# Patient Record
Sex: FEMALE | Race: WHITE | NOT HISPANIC OR LATINO
[De-identification: names, ages, dates, MRNs, and addresses within clinical notes are randomized per-mention and may not be internally consistent; named-entity substitution may affect disease eponyms.]

---

## 2018-05-23 ENCOUNTER — OTHER (OUTPATIENT)
Age: 68
End: 2018-05-23

## 2018-05-31 ENCOUNTER — APPOINTMENT (OUTPATIENT)
Dept: ORTHOPEDIC SURGERY | Facility: CLINIC | Age: 68
End: 2018-05-31

## 2018-09-15 ENCOUNTER — APPOINTMENT (OUTPATIENT)
Dept: DERMATOLOGY | Facility: CLINIC | Age: 68
End: 2018-09-15
Payer: COMMERCIAL

## 2018-09-15 ENCOUNTER — RESULT REVIEW (OUTPATIENT)
Age: 68
End: 2018-09-15

## 2018-09-15 PROCEDURE — 11100 BX SKIN SUBCUTANEOUS&/MUCOUS MEMBRANE 1 LESION: CPT | Mod: 59

## 2018-09-15 PROCEDURE — 99214 OFFICE O/P EST MOD 30 MIN: CPT | Mod: 25

## 2018-09-15 PROCEDURE — 17000 DESTRUCT PREMALG LESION: CPT

## 2018-09-15 PROCEDURE — 17003 DESTRUCT PREMALG LES 2-14: CPT

## 2018-10-15 ENCOUNTER — APPOINTMENT (OUTPATIENT)
Dept: DERMATOLOGY | Facility: CLINIC | Age: 68
End: 2018-10-15
Payer: COMMERCIAL

## 2018-10-15 PROCEDURE — 17261 DSTRJ MAL LES T/A/L .6-1.0CM: CPT

## 2018-12-03 ENCOUNTER — APPOINTMENT (OUTPATIENT)
Dept: ORTHOPEDIC SURGERY | Facility: CLINIC | Age: 68
End: 2018-12-03
Payer: COMMERCIAL

## 2018-12-04 ENCOUNTER — APPOINTMENT (OUTPATIENT)
Dept: ORTHOPEDIC SURGERY | Facility: CLINIC | Age: 68
End: 2018-12-04
Payer: COMMERCIAL

## 2018-12-04 DIAGNOSIS — S82.141A DISPLACED BICONDYLAR FRACTURE OF RIGHT TIBIA, INITIAL ENCOUNTER FOR CLOSED FRACTURE: ICD-10-CM

## 2018-12-04 DIAGNOSIS — S82.251A DISPLACED COMMINUTED FRACTURE OF SHAFT OF RIGHT TIBIA, INITIAL ENCOUNTER FOR CLOSED FRACTURE: ICD-10-CM

## 2018-12-04 PROCEDURE — 99214 OFFICE O/P EST MOD 30 MIN: CPT | Mod: 57

## 2018-12-04 PROCEDURE — 73590 X-RAY EXAM OF LOWER LEG: CPT | Mod: RT

## 2018-12-04 PROCEDURE — 27750 TREATMENT OF TIBIA FRACTURE: CPT | Mod: RT

## 2018-12-18 ENCOUNTER — APPOINTMENT (OUTPATIENT)
Dept: ORTHOPEDIC SURGERY | Facility: CLINIC | Age: 68
End: 2018-12-18
Payer: COMMERCIAL

## 2018-12-18 VITALS
SYSTOLIC BLOOD PRESSURE: 117 MMHG | HEART RATE: 80 BPM | WEIGHT: 139 LBS | DIASTOLIC BLOOD PRESSURE: 70 MMHG | HEIGHT: 67 IN | BODY MASS INDEX: 21.82 KG/M2

## 2018-12-18 DIAGNOSIS — G56.03 CARPAL TUNNEL SYNDROM,BILATERAL UPPER LIMBS: ICD-10-CM

## 2018-12-18 DIAGNOSIS — Z86.69 PERSONAL HISTORY OF OTHER DISEASES OF THE NERVOUS SYSTEM AND SENSE ORGANS: ICD-10-CM

## 2018-12-18 DIAGNOSIS — Z87.39 PERSONAL HISTORY OF OTHER DISEASES OF THE MUSCULOSKELETAL SYSTEM AND CONNECTIVE TISSUE: ICD-10-CM

## 2018-12-18 DIAGNOSIS — M41.50 OTHER SECONDARY SCOLIOSIS, SITE UNSPECIFIED: ICD-10-CM

## 2018-12-18 DIAGNOSIS — S34.129A: ICD-10-CM

## 2018-12-18 DIAGNOSIS — M47.812 SPONDYLOSIS W/OUT MYELOPATHY OR RADICULOPATHY, CERVICAL REGION: ICD-10-CM

## 2018-12-18 DIAGNOSIS — M47.816 SPONDYLOSIS W/OUT MYELOPATHY OR RADICULOPATHY, LUMBAR REGION: ICD-10-CM

## 2018-12-18 DIAGNOSIS — M43.8X9 OTHER SPECIFIED DEFORMING DORSOPATHIES, SITE UNSPECIFIED: ICD-10-CM

## 2018-12-18 PROCEDURE — 72100 X-RAY EXAM L-S SPINE 2/3 VWS: CPT

## 2018-12-18 PROCEDURE — 99215 OFFICE O/P EST HI 40 MIN: CPT | Mod: 24

## 2019-01-15 ENCOUNTER — APPOINTMENT (OUTPATIENT)
Dept: ORTHOPEDIC SURGERY | Facility: CLINIC | Age: 69
End: 2019-01-15

## 2019-02-11 ENCOUNTER — APPOINTMENT (OUTPATIENT)
Dept: DERMATOLOGY | Facility: CLINIC | Age: 69
End: 2019-02-11

## 2019-02-25 ENCOUNTER — OTHER (OUTPATIENT)
Age: 69
End: 2019-02-25

## 2019-02-26 ENCOUNTER — APPOINTMENT (OUTPATIENT)
Dept: ORTHOPEDIC SURGERY | Facility: CLINIC | Age: 69
End: 2019-02-26
Payer: COMMERCIAL

## 2019-02-26 PROCEDURE — 73590 X-RAY EXAM OF LOWER LEG: CPT | Mod: RT

## 2019-02-26 PROCEDURE — 99024 POSTOP FOLLOW-UP VISIT: CPT

## 2019-02-26 NOTE — REASON FOR VISIT
[Follow-Up Visit] : a follow-up visit for [Tibia Fracture] : tibia fracture [FreeTextEntry2] : Right tibia fracture follow up

## 2019-02-26 NOTE — HISTORY OF PRESENT ILLNESS
[de-identified] : 68 year old female  presents to the office today for routine follow up on non operative  right tibia fracture.  she is overall doing well.  She has minimal pain and sensation to RLE due to hx of spine surgery with residual neuro deficit.  She is minimally ambulatory and mainly wheelchair bound.  she was recently started on tymlos 80mg daily for osteoporosis.  She was seen by dr. Martínez who referred her to pain management.

## 2019-02-26 NOTE — DISCUSSION/SUMMARY
[de-identified] : 68-year-old female with refracture of right tibial shaft with good bone healing at fracture site- overall doing well.  She can continue activities as tolerated.  \par \par Orthopaedic Trauma Surgeon Addendum:\par \par I agree with the above physician assistant/nurse practitioner note.  Appropriate imaging has been reviewed and the plan adjusted as needed.\par \par We had a long discussion about the opportunity to adjust her leg alignment. We discussed that a multiplanar Ilizarov external fixator would be required with multiple surgical procedures to restore the alignment of the leg and likely fuse the knee. At the time being she wants to proceed with nonoperative management. She will followup as needed if she wants to discuss further surgical intervention.\par \par The patient was given the opportunity to ask questions and all questions were answered to their satisfaction.\par \par Jonas Stevenson MD\par Orthopaedic Trauma Surgeon\par [facility]\par [facility] [place]\par \par \par \par

## 2019-02-26 NOTE — PHYSICAL EXAM
[de-identified] : General: Well appearing, no acute distress, A&O\par Neurologic: A&Ox3, No focal deficits\par Head: NCAT without abrasions, lacerations, or ecchymosis to head, face, or scalp\par Eyes: No scleral icterus, no gross abnormalities\par Respiratory: Equal chest wall expansion bilaterally, no accessory muscle use\par Lymphatic: No lymphadenopathy palpated\par Skin: Warm and dry\par Psychiatric: Normal mood and affect\par \par Right lower extremity: No TTP over fracture sites. Range of motion of the knee from 45-95°. Decrease sensation to RLE. [de-identified] : plain films of the right tibia were obtained today. They show sequelae of multiple previous fractures with cont'd exuberant callus formation at the fracture site. Right distal shaft fracture with 25 degrees of flexion.\par

## 2019-06-22 ENCOUNTER — APPOINTMENT (OUTPATIENT)
Dept: DERMATOLOGY | Facility: CLINIC | Age: 69
End: 2019-06-22
Payer: COMMERCIAL

## 2019-06-22 PROCEDURE — 17000 DESTRUCT PREMALG LESION: CPT

## 2019-06-22 PROCEDURE — 99214 OFFICE O/P EST MOD 30 MIN: CPT | Mod: 25

## 2019-12-27 ENCOUNTER — RESULT REVIEW (OUTPATIENT)
Age: 69
End: 2019-12-27

## 2019-12-28 ENCOUNTER — APPOINTMENT (OUTPATIENT)
Dept: DERMATOLOGY | Facility: CLINIC | Age: 69
End: 2019-12-28
Payer: COMMERCIAL

## 2019-12-28 PROCEDURE — 17000 DESTRUCT PREMALG LESION: CPT

## 2019-12-28 PROCEDURE — 99214 OFFICE O/P EST MOD 30 MIN: CPT | Mod: 25

## 2020-03-12 ENCOUNTER — TRANSCRIPTION ENCOUNTER (OUTPATIENT)
Age: 70
End: 2020-03-12

## 2020-07-06 ENCOUNTER — APPOINTMENT (OUTPATIENT)
Dept: ORTHOPEDIC SURGERY | Facility: CLINIC | Age: 70
End: 2020-07-06
Payer: COMMERCIAL

## 2020-07-06 VITALS — SYSTOLIC BLOOD PRESSURE: 124 MMHG | HEART RATE: 84 BPM | DIASTOLIC BLOOD PRESSURE: 74 MMHG

## 2020-07-06 VITALS — TEMPERATURE: 97.6 F

## 2020-07-06 DIAGNOSIS — S82.251D DISPLACED COMMINUTED FRACTURE OF SHAFT OF RIGHT TIBIA, SUBSEQUENT ENCOUNTER FOR CLOSED FRACTURE WITH ROUTINE HEALING: ICD-10-CM

## 2020-07-06 DIAGNOSIS — G89.29 PAIN IN RIGHT KNEE: ICD-10-CM

## 2020-07-06 DIAGNOSIS — M25.562 PAIN IN RIGHT KNEE: ICD-10-CM

## 2020-07-06 DIAGNOSIS — M25.561 PAIN IN RIGHT KNEE: ICD-10-CM

## 2020-07-06 PROCEDURE — 99214 OFFICE O/P EST MOD 30 MIN: CPT

## 2020-07-06 PROCEDURE — 73560 X-RAY EXAM OF KNEE 1 OR 2: CPT | Mod: 50

## 2020-07-06 PROCEDURE — 73590 X-RAY EXAM OF LOWER LEG: CPT | Mod: RT

## 2020-07-06 NOTE — REASON FOR VISIT
[Follow-Up Visit] : a follow-up visit for [Tibia Fracture] : tibia fracture [FreeTextEntry2] : Right tibia fracture follow up.

## 2020-07-06 NOTE — HISTORY OF PRESENT ILLNESS
[de-identified] : The patient is a pleasant 70 year old female who presents to the office today for discussion of complex bilateral knee dislocations and a multiply fractured right tibia fracture that has gone on to malunion. the patient states she has had spine surgery which left her with some residual damage to the nerves of the right lower extremity and patchy neuropathy. She has decreased sensation globally in her leg.  She has been seeing her rheumatologist who has been treating her with Prolia and disease modifying anti-rheumatoid medications successfully.  Her  recently passed away from complications due to COVID.  Her stepson is a surgical tech here at the hospital and her daughter-in-law is a general surgeon at [facility].  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  She would like to be more functional.  She feels that her knees are limiting her significantly.  She does not have any significant pain but attributes this is much due to the neuropathy is anything else.\par \par  [3] : a current pain level of 3/10 [Bending] : worsened by bending [Lifting] : worsened by lifting [Recumbency] : relieved by recumbency [Weight Bearing] : worsened by weight bearing [Rest] : relieved by rest

## 2020-07-06 NOTE — DISCUSSION/SUMMARY
[de-identified] : 70-year-old female with rheumatoid arthritis, osteoporosis, spinal cord injury, bilateral knee dislocation/subluxation and right tibia malunion.  Her left knee is likely the most amenable to surgical repair.  I will consult with our arthroplasty service for discussion of possible complex arthroplasty.  Due to the chronicity of the dislocation, it is extremely unlikely that even an open reduction attempt would be possible.  She would likely need some sort of hinged knee replacement but I will defer to their expertise.  For the right leg, the dislocation with tibial plateau fracture is even more severe.  In addition the complex multiplanar malunion of the tibia is also a consideration.  We discussed everything from knee fusion to multiplane Ilizarov ring placement to correct the deformity.  We even discussed amputation as a treatment option.  She would like to start with the left side and see how that goes.  We discussed that we will see if the arthroplasty surface feels it is an option.  We will also see what they think about any surgery for the right side and the timing that would be allowed following a complex knee replacement.   We discussed that the practice will discuss her complex deformities and come up with a treatment plan.  If they do not hear something in a few days, they should feel free to contact us for an update.\par \par The patient was given the opportunity to ask questions and all questions were answered to their satisfaction.  We significantly with a collar and cuff but if he does need something to support it I think it is.\par \par Jonas Stevenson MD\par Orthopaedic Trauma Surgeon\par [facility] [place]\par Director Orthopaedic Trauma, [facility]\par \par \par \par

## 2020-07-06 NOTE — PHYSICAL EXAM
[de-identified] : Physical Exam:\par General: Well appearing, no acute distress, A&O\par Neurologic: A&Ox3, No focal deficits\par Head: NCAT without abrasions, lacerations, or ecchymosis to head, face, or scalp\par Respiratory: Equal chest wall expansion bilaterally, no accessory muscle use\par Lymphatic: No lymphadenopathy palpated\par Skin: Warm and dry\par Psychiatric: Normal mood and affect\par \par BLE:\par Bilateral decreased sensation and limited motor function from chronic spinal cord injury\par \par LLE:\par Knee range of motion from 100-60 degrees of flexion.  Palpable subluxation with extension.\par \par RLE: \par Knee range of motion from 100-60 degrees of flexion\par Gross subluxation and motion of the knee joint with range of motion\par Malunion of tibia noted with multiple planes of deformity\par \par No open wounds on either lower extremity\par  [de-identified] : plain films of the right tibia and bilateral knees were obtained today. They show sequelae of multiple previous fractures with mature callus formation at the fracture sites.  The bilateral knees show anterior tibial dislocation and complex deformity\par

## 2020-07-28 ENCOUNTER — APPOINTMENT (OUTPATIENT)
Dept: ORTHOPEDIC SURGERY | Facility: CLINIC | Age: 70
End: 2020-07-28
Payer: COMMERCIAL

## 2020-07-28 VITALS
HEART RATE: 75 BPM | WEIGHT: 139 LBS | DIASTOLIC BLOOD PRESSURE: 79 MMHG | BODY MASS INDEX: 21.82 KG/M2 | SYSTOLIC BLOOD PRESSURE: 136 MMHG | HEIGHT: 67 IN

## 2020-07-28 DIAGNOSIS — M17.0 BILATERAL PRIMARY OSTEOARTHRITIS OF KNEE: ICD-10-CM

## 2020-07-28 DIAGNOSIS — M25.562 PAIN IN LEFT KNEE: ICD-10-CM

## 2020-07-28 PROCEDURE — 73564 X-RAY EXAM KNEE 4 OR MORE: CPT | Mod: 50

## 2020-07-28 PROCEDURE — 99215 OFFICE O/P EST HI 40 MIN: CPT

## 2020-07-28 NOTE — ADDENDUM
[FreeTextEntry1] : This note was authored by Maikol Marte working as a medical scribe for Dr. Germain Mcneal. The note was reviewed, edited, and revised by Dr. Germain Mcneal whom is in agreement with the exam findings, imaging findings, and treatment plan. 07/28/2020.

## 2020-07-28 NOTE — CONSULT LETTER
[Dear  ___] : Dear  [unfilled], [Consult Letter:] : I had the pleasure of evaluating your patient, [unfilled]. [Please see my note below.] : Please see my note below. [Consult Closing:] : Thank you very much for allowing me to participate in the care of this patient.  If you have any questions, please do not hesitate to contact me. [Sincerely,] : Sincerely, [FreeTextEntry2] : NAMRATA MALDONADO MD\par  [FreeTextEntry3] : Germain Mcneal MD\par Chief of Joint Replacement\par [facility] \par [facility] [place]\par \par

## 2020-07-28 NOTE — HISTORY OF PRESENT ILLNESS
[de-identified] : Patient is a 70-year-old female presenting with years of bilateral knee pain right worse than left.  She has a history of rheumatoid arthritis, osteoporosis, spinal cord injury with reduced sensation distally of the bilateral lower extremities, and spontaneous right tibia fractures in 2018.  Patient has been nonweightbearing since 2018 and is unable to stand due to limited range of motion of the knees and inability to extend the knees.  Patient has generalized pain to the bilateral knees worse with range of motion.  She has not had injections recently to the bilateral knees, she has tried therapy without significant improvement.  She is tried over-the-counter anti-inflammatories without relief.  Patient was referred here for discussion of potential left total knee replacement.  She has a past surgical history of multiple spine surgeries, for scoliosis, and spinal fusion.  As a result of these various surgeries she developed an ulcer on the right buttocks in 2013 which after various surgeries of skin flaps did not completely close until 11/15/2019.  She had multiple infections throughout this time, including MRSA and osteomyelitis requiring IV antibiotics and multiple surgeries for wound culture.  Patient completed IV antibiotics on 12/26/2019 for osteomyelitis which is now resolved.

## 2020-07-28 NOTE — PHYSICAL EXAM
[de-identified] : The patient appears well nourished  and in no apparent distress.  The patient is alert and oriented to person, place, and time.   Affect and mood appear normal. The head is normocephalic and atraumatic.  The eyes reveal normal sclera and extra ocular muscles are intact. The tongue is midline with no apparent lesions.  Skin shows normal turgor with no evidence of eczema or psoriasis.  No respiratory distress noted.  \par   [de-identified] : Exam of the left knee shows active extension to -55 degrees, flexion of 106 degrees. EHL is 0/5, TA 3/5, mildly decreased sensation of the medial foot, mildly decreased sensation over the first webspace, insensate of the lateral foot, sensation intact lateral leg, no sensation over the anterior thigh, sensation of the posterior thigh.\par  [de-identified] : Xray- 4 views of the left knee shows a Charcot joint with dislocation of the femur posterior to the tibia.

## 2020-07-28 NOTE — DISCUSSION/SUMMARY
[de-identified] : The patient is a 70 year old female with a left knee Charcot joint with femoral-tibial dislocation.  She has limited function from this because she cannot straighten the leg.  She uses her left leg as her main pivoting leg when transferring from the wheelchair to the toilet and bed.  Her right leg is dysfunctional because of neuropathy and multiple prior spontaneous fractures of the right leg with deformity and a Charcot knee on the right side.  Her right buttock wound has finally healed after surgery.  She was here today with her family to discuss potential left knee replacement.  The main goal of surgery for her is to achieve a leg that is more straight so that she can pivot on it and walk for limited distances with a walker.  We discussed that this has an extremely high complication rate in her case with the potential ultimate need for distal femur replacement, arthrodesis which may not heal in the setting of a Charcot joint, and even above-knee amputation.  She is going to consider this with her family and all of the options.  At a minimum she would require a hinged prosthesis.  All the risks, benefits, alternatives were discussed in full and all questions were answered.  She and her family understand that this is an extremely high risk procedure and only to be considered if they determine she can no longer live the way she is living from a functional standpoint.

## 2020-11-28 ENCOUNTER — APPOINTMENT (OUTPATIENT)
Dept: DERMATOLOGY | Facility: CLINIC | Age: 70
End: 2020-11-28

## 2021-03-09 ENCOUNTER — APPOINTMENT (OUTPATIENT)
Dept: DERMATOLOGY | Facility: CLINIC | Age: 71
End: 2021-03-09
Payer: COMMERCIAL

## 2021-03-09 PROCEDURE — 99072 ADDL SUPL MATRL&STAF TM PHE: CPT

## 2021-03-09 PROCEDURE — 17110 DESTRUCTION B9 LES UP TO 14: CPT

## 2021-03-09 PROCEDURE — 99214 OFFICE O/P EST MOD 30 MIN: CPT | Mod: 25

## 2021-04-14 ENCOUNTER — EMERGENCY (EMERGENCY)
Facility: HOSPITAL | Age: 71
LOS: 1 days | Discharge: HOME | End: 2021-04-14
Attending: EMERGENCY MEDICINE
Payer: COMMERCIAL

## 2021-04-14 VITALS
DIASTOLIC BLOOD PRESSURE: 63 MMHG | SYSTOLIC BLOOD PRESSURE: 137 MMHG | OXYGEN SATURATION: 95 % | RESPIRATION RATE: 18 BRPM | WEIGHT: 149.91 LBS | HEIGHT: 66 IN | HEART RATE: 88 BPM | TEMPERATURE: 99 F

## 2021-04-14 DIAGNOSIS — Z98.89 OTHER SPECIFIED POSTPROCEDURAL STATES: Chronic | ICD-10-CM

## 2021-04-14 LAB
ALBUMIN SERPL ELPH-MCNC: 3.9 G/DL — SIGNIFICANT CHANGE UP (ref 3.3–5.2)
ALP SERPL-CCNC: 109 U/L — SIGNIFICANT CHANGE UP (ref 40–120)
ALT FLD-CCNC: 22 U/L — SIGNIFICANT CHANGE UP
ANION GAP SERPL CALC-SCNC: 11 MMOL/L — SIGNIFICANT CHANGE UP (ref 5–17)
AST SERPL-CCNC: 20 U/L — SIGNIFICANT CHANGE UP
BILIRUB SERPL-MCNC: 0.3 MG/DL — LOW (ref 0.4–2)
BUN SERPL-MCNC: 13 MG/DL — SIGNIFICANT CHANGE UP (ref 8–20)
CALCIUM SERPL-MCNC: 9 MG/DL — SIGNIFICANT CHANGE UP (ref 8.6–10.2)
CHLORIDE SERPL-SCNC: 101 MMOL/L — SIGNIFICANT CHANGE UP (ref 98–107)
CO2 SERPL-SCNC: 28 MMOL/L — SIGNIFICANT CHANGE UP (ref 22–29)
CREAT SERPL-MCNC: 0.31 MG/DL — LOW (ref 0.5–1.3)
CRP SERPL-MCNC: 85 MG/L — HIGH
GLUCOSE SERPL-MCNC: 94 MG/DL — SIGNIFICANT CHANGE UP (ref 70–99)
HCT VFR BLD CALC: 40.4 % — SIGNIFICANT CHANGE UP (ref 34.5–45)
HGB BLD-MCNC: 12.5 G/DL — SIGNIFICANT CHANGE UP (ref 11.5–15.5)
MCHC RBC-ENTMCNC: 28.3 PG — SIGNIFICANT CHANGE UP (ref 27–34)
MCHC RBC-ENTMCNC: 30.9 GM/DL — LOW (ref 32–36)
MCV RBC AUTO: 91.4 FL — SIGNIFICANT CHANGE UP (ref 80–100)
PLATELET # BLD AUTO: 276 K/UL — SIGNIFICANT CHANGE UP (ref 150–400)
POTASSIUM SERPL-MCNC: 4.1 MMOL/L — SIGNIFICANT CHANGE UP (ref 3.5–5.3)
POTASSIUM SERPL-SCNC: 4.1 MMOL/L — SIGNIFICANT CHANGE UP (ref 3.5–5.3)
PROT SERPL-MCNC: 7.5 G/DL — SIGNIFICANT CHANGE UP (ref 6.6–8.7)
RBC # BLD: 4.42 M/UL — SIGNIFICANT CHANGE UP (ref 3.8–5.2)
RBC # FLD: 14.3 % — SIGNIFICANT CHANGE UP (ref 10.3–14.5)
SODIUM SERPL-SCNC: 140 MMOL/L — SIGNIFICANT CHANGE UP (ref 135–145)
WBC # BLD: 11.66 K/UL — HIGH (ref 3.8–10.5)
WBC # FLD AUTO: 11.66 K/UL — HIGH (ref 3.8–10.5)

## 2021-04-14 PROCEDURE — 99284 EMERGENCY DEPT VISIT MOD MDM: CPT

## 2021-04-14 PROCEDURE — 80053 COMPREHEN METABOLIC PANEL: CPT

## 2021-04-14 PROCEDURE — 36415 COLL VENOUS BLD VENIPUNCTURE: CPT

## 2021-04-14 PROCEDURE — 96374 THER/PROPH/DIAG INJ IV PUSH: CPT

## 2021-04-14 PROCEDURE — 85027 COMPLETE CBC AUTOMATED: CPT

## 2021-04-14 PROCEDURE — 85652 RBC SED RATE AUTOMATED: CPT

## 2021-04-14 PROCEDURE — 86140 C-REACTIVE PROTEIN: CPT

## 2021-04-14 PROCEDURE — 73080 X-RAY EXAM OF ELBOW: CPT | Mod: 26,RT

## 2021-04-14 PROCEDURE — 99284 EMERGENCY DEPT VISIT MOD MDM: CPT | Mod: 25

## 2021-04-14 PROCEDURE — 73080 X-RAY EXAM OF ELBOW: CPT

## 2021-04-14 RX ORDER — CEPHALEXIN 500 MG
1 CAPSULE ORAL
Qty: 20 | Refills: 0
Start: 2021-04-14 | End: 2021-04-23

## 2021-04-14 RX ORDER — CEFAZOLIN SODIUM 1 G
1000 VIAL (EA) INJECTION ONCE
Refills: 0 | Status: COMPLETED | OUTPATIENT
Start: 2021-04-14 | End: 2021-04-14

## 2021-04-14 RX ADMIN — Medication 100 MILLIGRAM(S): at 22:43

## 2021-04-14 NOTE — ED ADULT TRIAGE NOTE - CHIEF COMPLAINT QUOTE
pt with swelling and redness to right elbow x5 days. was told its septic bursitis and told to come for IV antibiotics

## 2021-04-14 NOTE — ED STATDOCS - PROGRESS NOTE DETAILS
69 y/o female with PMHx of neuropathy . Pt banged elbow on wheelchair and got small abrasion. Pt states pain has worsened and elbow and is now red. Pt ortho Dr. Ramirez saw a picture of it and thought it may be septic bursitis. send to [facility] for further eval. Evaluate for cellulitis vs septic bursitis POLO- PT evaluated by intake physician. HPI/PE/ROS as noted above. Will follow up plan per intake physician POLO- cleared by ortho, will give keflex and ortho f/u , Pt reassessed, pt feeling better at this time, vss, pt able to walk, talk and vocalized plan of action. Discussed in depth and explained to pt in depth the next steps that need to be taking including proper follow up with PCP or specialists. All incidental findings were discussed with pt as well. Pt verbalized their concerns and all questions were answered. Pt understands dispo and wants discharge. Given good instructions when to return to ED and importance of f/u.

## 2021-04-14 NOTE — ED STATDOCS - OBJECTIVE STATEMENT
71 y/o female with PMHx of neuropathy . Pt banged elbow on wheelchair and got small abrasion. Pt states pain has worsened and elbow and is now red. Pt ortho Dr. Ramirez saw a picture of it and thought it may be septic bursitis.

## 2021-04-14 NOTE — ED STATDOCS - NS_ ATTENDINGSCRIBEDETAILS _ED_A_ED_FT
I, Akhil Burgess, performed the initial face to face bedside interview with this patient regarding history of present illness, review of symptoms and relevant past medical, social and family history.  I completed an independent physical examination.  I was the initial provider who evaluated this patient. I have signed out the follow up of any pending tests (i.e. labs, radiological studies) to the ACP.  I have communicated the patient’s plan of care and disposition with the ACP.  The history, relevant review of systems, past medical and surgical history, medical decision making, and physical examination was documented by the scribe in my presence and I attest to the accuracy of the documentation.

## 2021-04-14 NOTE — CONSULT NOTE ADULT - SUBJECTIVE AND OBJECTIVE BOX
Pt Name: PATRIA MCLAIN    MRN: [id number]      Patient is a 70y Female presenting to the emergency department with a chief complaint of right elbow redness/pain. Patient states she hit her elbow on her wheelchair armrest about 5 days ago and then noticed redness and pain to elbow about 2 days ago. Patient able to range elbow with no difficulty. Denies any discharge or drainage. Denies fever/chills, numbness/tingling.       REVIEW OF SYSTEMS    General: Alert, responsive, in NAD    Skin/Breast: No rashes, no pruritis     Respiratory and Thorax: No difficulty breathing. No cough.  	   Cardiovascular:	No chest pain. No palpitations.    Gastrointestinal:	 No abdominal pain. No diarrhea.     Musculoskeletal: SEE HPI.    Neurological: No sensory or motor changes.     Hematology/Lymphatics: No swelling.    ROS is otherwise negative.      PAST MEDICAL & SURGICAL HISTORY:  Neuropathy    Neurogenic bladder    History of back surgery        Allergies: No Known Allergies      Medications:     FAMILY HISTORY:  : non-contributory    Social History:       Vital Signs Last 24 Hrs  T(C): 37.1 (14 Apr 2021 19:17), Max: 37.1 (14 Apr 2021 19:17)  T(F): 98.7 (14 Apr 2021 19:17), Max: 98.7 (14 Apr 2021 19:17)  HR: 88 (14 Apr 2021 19:17) (88 - 88)  BP: 137/63 (14 Apr 2021 19:17) (137/63 - 137/63)  BP(mean): --  RR: 18 (14 Apr 2021 19:17) (18 - 18)  SpO2: 95% (14 Apr 2021 19:17) (95% - 95%)    Daily Height in cm: 167.64 (14 Apr 2021 19:17)    Daily       PHYSICAL EXAM:    Appearance: Alert, responsive, in no acute distress.    Musculoskeletal:       Right Upper Extremity: +erythema to right elbow with small scab noted. No drainage or discharge. No bleeding. No fluctuance noted. NROM. Nontender. Sensation is grossly intact to light touch. Median/ulnar/radial nerve intact.  strength 5/5, WF/WE 5/5, EF/EE 5/5. Radial pulses 2+. Cap refill < 2 seconds. No cyanosis.       Imaging Studies:  X-ray right elbow: official reads pending. No acute fractures noted          A/P:  Pt is a  70y Female with right elbow pain/erythema    PLAN:   * no acute orthopedic surgical intervention at this time  * dose of IV antibiotics given in ED, may d/c on oral ABX  * elevation/ice  * educated patient on signs/symptoms to look for including worsening redness, swelling, numbness/tingling, fever/chills, drainage/discharge  * Patient may follow-up on outpatient basis if no improvement on oral antibiotics

## 2021-04-14 NOTE — ED STATDOCS - PATIENT PORTAL LINK FT
You can access the FollowMyHealth Patient Portal offered by [facility] by registering at the following website: [email or web address]. By joining [employer]’s FollowMyHealth portal, you will also be able to view your health information using other applications (apps) compatible with our system.

## 2021-04-15 LAB — ERYTHROCYTE [SEDIMENTATION RATE] IN BLOOD: 42 MM/HR — HIGH (ref 0–20)

## 2021-04-20 ENCOUNTER — TRANSCRIPTION ENCOUNTER (OUTPATIENT)
Age: 71
End: 2021-04-20

## 2021-04-21 ENCOUNTER — APPOINTMENT (OUTPATIENT)
Age: 71
End: 2021-04-21
Payer: COMMERCIAL

## 2021-04-21 ENCOUNTER — TRANSCRIPTION ENCOUNTER (OUTPATIENT)
Age: 71
End: 2021-04-21

## 2021-04-21 ENCOUNTER — INPATIENT (INPATIENT)
Facility: HOSPITAL | Age: 71
LOS: 4 days | Discharge: HOME | DRG: 496 | End: 2021-04-26
Attending: STUDENT IN AN ORGANIZED HEALTH CARE EDUCATION/TRAINING PROGRAM | Admitting: HOSPITALIST
Payer: COMMERCIAL

## 2021-04-21 VITALS
BODY MASS INDEX: 24.11 KG/M2 | HEART RATE: 73 BPM | WEIGHT: 150 LBS | DIASTOLIC BLOOD PRESSURE: 73 MMHG | HEIGHT: 66 IN | SYSTOLIC BLOOD PRESSURE: 121 MMHG

## 2021-04-21 VITALS
TEMPERATURE: 98 F | WEIGHT: 149.91 LBS | HEART RATE: 76 BPM | DIASTOLIC BLOOD PRESSURE: 82 MMHG | OXYGEN SATURATION: 97 % | RESPIRATION RATE: 18 BRPM | SYSTOLIC BLOOD PRESSURE: 145 MMHG | HEIGHT: 66 IN

## 2021-04-21 DIAGNOSIS — Z98.89 OTHER SPECIFIED POSTPROCEDURAL STATES: Chronic | ICD-10-CM

## 2021-04-21 DIAGNOSIS — M71.121 OTHER INFECTIVE BURSITIS, RIGHT ELBOW: ICD-10-CM

## 2021-04-21 LAB
ALBUMIN SERPL ELPH-MCNC: 3.7 G/DL — SIGNIFICANT CHANGE UP (ref 3.3–5.2)
ALLERGY+IMMUNOLOGY DIAG STUDY NOTE: SIGNIFICANT CHANGE UP
ALP SERPL-CCNC: 106 U/L — SIGNIFICANT CHANGE UP (ref 40–120)
ALT FLD-CCNC: 17 U/L — SIGNIFICANT CHANGE UP
ANION GAP SERPL CALC-SCNC: 9 MMOL/L — SIGNIFICANT CHANGE UP (ref 5–17)
APTT BLD: 36.7 SEC — HIGH (ref 27.5–35.5)
AST SERPL-CCNC: 19 U/L — SIGNIFICANT CHANGE UP
BASOPHILS # BLD AUTO: 0.04 K/UL — SIGNIFICANT CHANGE UP (ref 0–0.2)
BASOPHILS NFR BLD AUTO: 0.6 % — SIGNIFICANT CHANGE UP (ref 0–2)
BILIRUB SERPL-MCNC: 0.2 MG/DL — LOW (ref 0.4–2)
BLD GP AB SCN SERPL QL: SIGNIFICANT CHANGE UP
BUN SERPL-MCNC: 14 MG/DL — SIGNIFICANT CHANGE UP (ref 8–20)
CALCIUM SERPL-MCNC: 9.3 MG/DL — SIGNIFICANT CHANGE UP (ref 8.6–10.2)
CHLORIDE SERPL-SCNC: 100 MMOL/L — SIGNIFICANT CHANGE UP (ref 98–107)
CO2 SERPL-SCNC: 29 MMOL/L — SIGNIFICANT CHANGE UP (ref 22–29)
CREAT SERPL-MCNC: 0.32 MG/DL — LOW (ref 0.5–1.3)
DIR ANTIGLOB POLYSPECIFIC INTERPRETATION: SIGNIFICANT CHANGE UP
EOSINOPHIL # BLD AUTO: 0.21 K/UL — SIGNIFICANT CHANGE UP (ref 0–0.5)
EOSINOPHIL NFR BLD AUTO: 3.2 % — SIGNIFICANT CHANGE UP (ref 0–6)
GLUCOSE SERPL-MCNC: 81 MG/DL — SIGNIFICANT CHANGE UP (ref 70–99)
HCT VFR BLD CALC: 40.6 % — SIGNIFICANT CHANGE UP (ref 34.5–45)
HGB BLD-MCNC: 12.6 G/DL — SIGNIFICANT CHANGE UP (ref 11.5–15.5)
IMM GRANULOCYTES NFR BLD AUTO: 0.6 % — SIGNIFICANT CHANGE UP (ref 0–1.5)
INR BLD: 1.08 RATIO — SIGNIFICANT CHANGE UP (ref 0.88–1.16)
LYMPHOCYTES # BLD AUTO: 1.87 K/UL — SIGNIFICANT CHANGE UP (ref 1–3.3)
LYMPHOCYTES # BLD AUTO: 28.8 % — SIGNIFICANT CHANGE UP (ref 13–44)
MCHC RBC-ENTMCNC: 28.3 PG — SIGNIFICANT CHANGE UP (ref 27–34)
MCHC RBC-ENTMCNC: 31 GM/DL — LOW (ref 32–36)
MCV RBC AUTO: 91.2 FL — SIGNIFICANT CHANGE UP (ref 80–100)
MONOCYTES # BLD AUTO: 0.56 K/UL — SIGNIFICANT CHANGE UP (ref 0–0.9)
MONOCYTES NFR BLD AUTO: 8.6 % — SIGNIFICANT CHANGE UP (ref 2–14)
NEUTROPHILS # BLD AUTO: 3.78 K/UL — SIGNIFICANT CHANGE UP (ref 1.8–7.4)
NEUTROPHILS NFR BLD AUTO: 58.2 % — SIGNIFICANT CHANGE UP (ref 43–77)
PLATELET # BLD AUTO: 331 K/UL — SIGNIFICANT CHANGE UP (ref 150–400)
POTASSIUM SERPL-MCNC: 4.7 MMOL/L — SIGNIFICANT CHANGE UP (ref 3.5–5.3)
POTASSIUM SERPL-SCNC: 4.7 MMOL/L — SIGNIFICANT CHANGE UP (ref 3.5–5.3)
PROT SERPL-MCNC: 7.5 G/DL — SIGNIFICANT CHANGE UP (ref 6.6–8.7)
PROTHROM AB SERPL-ACNC: 12.5 SEC — SIGNIFICANT CHANGE UP (ref 10.6–13.6)
RBC # BLD: 4.45 M/UL — SIGNIFICANT CHANGE UP (ref 3.8–5.2)
RBC # FLD: 13.9 % — SIGNIFICANT CHANGE UP (ref 10.3–14.5)
SARS-COV-2 RNA SPEC QL NAA+PROBE: SIGNIFICANT CHANGE UP
SODIUM SERPL-SCNC: 138 MMOL/L — SIGNIFICANT CHANGE UP (ref 135–145)
WBC # BLD: 6.5 K/UL — SIGNIFICANT CHANGE UP (ref 3.8–10.5)
WBC # FLD AUTO: 6.5 K/UL — SIGNIFICANT CHANGE UP (ref 3.8–10.5)

## 2021-04-21 PROCEDURE — 99213 OFFICE O/P EST LOW 20 MIN: CPT

## 2021-04-21 PROCEDURE — 93010 ELECTROCARDIOGRAM REPORT: CPT

## 2021-04-21 PROCEDURE — 73080 X-RAY EXAM OF ELBOW: CPT | Mod: 26,RT

## 2021-04-21 PROCEDURE — 99072 ADDL SUPL MATRL&STAF TM PHE: CPT

## 2021-04-21 PROCEDURE — 99222 1ST HOSP IP/OBS MODERATE 55: CPT

## 2021-04-21 PROCEDURE — 99221 1ST HOSP IP/OBS SF/LOW 40: CPT | Mod: 25

## 2021-04-21 PROCEDURE — 99285 EMERGENCY DEPT VISIT HI MDM: CPT

## 2021-04-21 PROCEDURE — 99223 1ST HOSP IP/OBS HIGH 75: CPT

## 2021-04-21 PROCEDURE — 23931 I&D UPR A/E BURSA: CPT | Mod: RT

## 2021-04-21 PROCEDURE — 71045 X-RAY EXAM CHEST 1 VIEW: CPT | Mod: 26

## 2021-04-21 PROCEDURE — 86077 PHYS BLOOD BANK SERV XMATCH: CPT

## 2021-04-21 RX ORDER — TRAMADOL HYDROCHLORIDE 50 MG/1
100 TABLET ORAL EVERY 6 HOURS
Refills: 0 | Status: DISCONTINUED | OUTPATIENT
Start: 2021-04-21 | End: 2021-04-26

## 2021-04-21 RX ORDER — HYDROMORPHONE HYDROCHLORIDE 2 MG/ML
0.5 INJECTION INTRAMUSCULAR; INTRAVENOUS; SUBCUTANEOUS
Refills: 0 | Status: DISCONTINUED | OUTPATIENT
Start: 2021-04-21 | End: 2021-04-21

## 2021-04-21 RX ORDER — ACETAMINOPHEN 500 MG
650 TABLET ORAL EVERY 4 HOURS
Refills: 0 | Status: DISCONTINUED | OUTPATIENT
Start: 2021-04-21 | End: 2021-04-26

## 2021-04-21 RX ORDER — BETHANECHOL CHLORIDE 25 MG
1 TABLET ORAL
Qty: 0 | Refills: 0 | DISCHARGE

## 2021-04-21 RX ORDER — SENNA PLUS 8.6 MG/1
2 TABLET ORAL AT BEDTIME
Refills: 0 | Status: DISCONTINUED | OUTPATIENT
Start: 2021-04-21 | End: 2021-04-26

## 2021-04-21 RX ORDER — CALCIUM CARBONATE 500(1250)
3 TABLET ORAL EVERY 6 HOURS
Refills: 0 | Status: DISCONTINUED | OUTPATIENT
Start: 2021-04-21 | End: 2021-04-26

## 2021-04-21 RX ORDER — TRAMADOL HYDROCHLORIDE 50 MG/1
50 TABLET ORAL EVERY 6 HOURS
Refills: 0 | Status: DISCONTINUED | OUTPATIENT
Start: 2021-04-21 | End: 2021-04-26

## 2021-04-21 RX ORDER — ESCITALOPRAM OXALATE 10 MG/1
1 TABLET, FILM COATED ORAL
Qty: 0 | Refills: 0 | DISCHARGE

## 2021-04-21 RX ORDER — CELECOXIB 200 MG/1
1 CAPSULE ORAL
Qty: 0 | Refills: 0 | DISCHARGE

## 2021-04-21 RX ORDER — GABAPENTIN 400 MG/1
300 CAPSULE ORAL THREE TIMES A DAY
Refills: 0 | Status: DISCONTINUED | OUTPATIENT
Start: 2021-04-21 | End: 2021-04-21

## 2021-04-21 RX ORDER — BETHANECHOL CHLORIDE 25 MG
10 TABLET ORAL THREE TIMES A DAY
Refills: 0 | Status: DISCONTINUED | OUTPATIENT
Start: 2021-04-21 | End: 2021-04-21

## 2021-04-21 RX ORDER — SENNA PLUS 8.6 MG/1
2 TABLET ORAL AT BEDTIME
Refills: 0 | Status: DISCONTINUED | OUTPATIENT
Start: 2021-04-21 | End: 2021-04-21

## 2021-04-21 RX ORDER — ONDANSETRON 8 MG/1
4 TABLET, FILM COATED ORAL
Refills: 0 | Status: DISCONTINUED | OUTPATIENT
Start: 2021-04-21 | End: 2021-04-21

## 2021-04-21 RX ORDER — CALCIUM CARBONATE 500(1250)
3 TABLET ORAL EVERY 6 HOURS
Refills: 0 | Status: DISCONTINUED | OUTPATIENT
Start: 2021-04-21 | End: 2021-04-21

## 2021-04-21 RX ORDER — CELECOXIB 200 MG/1
200 CAPSULE ORAL DAILY
Refills: 0 | Status: DISCONTINUED | OUTPATIENT
Start: 2021-04-21 | End: 2021-04-26

## 2021-04-21 RX ORDER — ESCITALOPRAM OXALATE 10 MG/1
20 TABLET, FILM COATED ORAL DAILY
Refills: 0 | Status: DISCONTINUED | OUTPATIENT
Start: 2021-04-21 | End: 2021-04-26

## 2021-04-21 RX ORDER — VANCOMYCIN HCL 1 G
1000 VIAL (EA) INTRAVENOUS EVERY 12 HOURS
Refills: 0 | Status: DISCONTINUED | OUTPATIENT
Start: 2021-04-22 | End: 2021-04-26

## 2021-04-21 RX ORDER — GABAPENTIN 400 MG/1
300 CAPSULE ORAL THREE TIMES A DAY
Refills: 0 | Status: DISCONTINUED | OUTPATIENT
Start: 2021-04-21 | End: 2021-04-26

## 2021-04-21 RX ORDER — FENTANYL CITRATE 50 UG/ML
50 INJECTION INTRAVENOUS
Refills: 0 | Status: DISCONTINUED | OUTPATIENT
Start: 2021-04-21 | End: 2021-04-21

## 2021-04-21 RX ORDER — CEFAZOLIN SODIUM 1 G
2000 VIAL (EA) INJECTION EVERY 8 HOURS
Refills: 0 | Status: DISCONTINUED | OUTPATIENT
Start: 2021-04-21 | End: 2021-04-21

## 2021-04-21 RX ORDER — CELECOXIB 200 MG/1
200 CAPSULE ORAL DAILY
Refills: 0 | Status: DISCONTINUED | OUTPATIENT
Start: 2021-04-21 | End: 2021-04-21

## 2021-04-21 RX ORDER — ESCITALOPRAM OXALATE 10 MG/1
20 TABLET, FILM COATED ORAL DAILY
Refills: 0 | Status: DISCONTINUED | OUTPATIENT
Start: 2021-04-21 | End: 2021-04-21

## 2021-04-21 RX ORDER — CEFAZOLIN SODIUM 1 G
2000 VIAL (EA) INJECTION EVERY 8 HOURS
Refills: 0 | Status: DISCONTINUED | OUTPATIENT
Start: 2021-04-21 | End: 2021-04-23

## 2021-04-21 RX ORDER — VANCOMYCIN HCL 1 G
1000 VIAL (EA) INTRAVENOUS EVERY 12 HOURS
Refills: 0 | Status: DISCONTINUED | OUTPATIENT
Start: 2021-04-21 | End: 2021-04-21

## 2021-04-21 RX ORDER — TRAMADOL HYDROCHLORIDE 50 MG/1
50 TABLET ORAL EVERY 6 HOURS
Refills: 0 | Status: DISCONTINUED | OUTPATIENT
Start: 2021-04-21 | End: 2021-04-21

## 2021-04-21 RX ORDER — VANCOMYCIN HCL 1 G
1000 VIAL (EA) INTRAVENOUS EVERY 12 HOURS
Refills: 0 | Status: CANCELLED | OUTPATIENT
Start: 2021-04-22 | End: 2021-04-21

## 2021-04-21 RX ORDER — ACETAMINOPHEN 500 MG
650 TABLET ORAL EVERY 4 HOURS
Refills: 0 | Status: DISCONTINUED | OUTPATIENT
Start: 2021-04-21 | End: 2021-04-21

## 2021-04-21 RX ORDER — MORPHINE SULFATE 50 MG/1
1 CAPSULE, EXTENDED RELEASE ORAL EVERY 4 HOURS
Refills: 0 | Status: DISCONTINUED | OUTPATIENT
Start: 2021-04-21 | End: 2021-04-21

## 2021-04-21 RX ORDER — ASPIRIN/CALCIUM CARB/MAGNESIUM 324 MG
325 TABLET ORAL DAILY
Refills: 0 | Status: DISCONTINUED | OUTPATIENT
Start: 2021-04-22 | End: 2021-04-25

## 2021-04-21 RX ORDER — BETHANECHOL CHLORIDE 25 MG
10 TABLET ORAL THREE TIMES A DAY
Refills: 0 | Status: DISCONTINUED | OUTPATIENT
Start: 2021-04-21 | End: 2021-04-26

## 2021-04-21 RX ADMIN — Medication 100 MILLIGRAM(S): at 21:21

## 2021-04-21 RX ADMIN — TRAMADOL HYDROCHLORIDE 100 MILLIGRAM(S): 50 TABLET ORAL at 23:23

## 2021-04-21 RX ADMIN — TRAMADOL HYDROCHLORIDE 100 MILLIGRAM(S): 50 TABLET ORAL at 22:23

## 2021-04-21 RX ADMIN — Medication 250 MILLIGRAM(S): at 12:27

## 2021-04-21 RX ADMIN — Medication 10 MILLIGRAM(S): at 22:23

## 2021-04-21 RX ADMIN — GABAPENTIN 300 MILLIGRAM(S): 400 CAPSULE ORAL at 21:21

## 2021-04-21 RX ADMIN — HYDROMORPHONE HYDROCHLORIDE 0.5 MILLIGRAM(S): 2 INJECTION INTRAMUSCULAR; INTRAVENOUS; SUBCUTANEOUS at 18:52

## 2021-04-21 RX ADMIN — Medication 250 MILLIGRAM(S): at 23:14

## 2021-04-21 RX ADMIN — Medication 100 MILLIGRAM(S): at 15:20

## 2021-04-21 NOTE — DISCHARGE NOTE PROVIDER - NSDCFUADDINST_GEN_ALL_CORE_FT
ORTHO: The patient will be seen in the office between 2-3 weeks for wound check. Patient may shower after post-op day #5. The dressing is to be removed on POD#7. IF THE DRESSING BECOMES SOILED BEFORE THE REMOVAL DATE, CHANGE WITH A SIMILAR DRESSING. IF THE DRESSING BECOMES STAINED WITH DISCHARGE, CONTACT THE OFFICE FOR FURTHER DIRECTIONS.  The patient will contact the office if the wound becomes red, has increasing pain, develops bleeding or discharge, an injury occurs, or has other concerns. The patient will continue ASPIRIN 325mg daily for blood clot prevention for 4 weeks. The patient is FULL weight bearing. Patient will continue antibiotics as per infectious disease.

## 2021-04-21 NOTE — ED PROVIDER NOTE - CLINICAL SUMMARY MEDICAL DECISION MAKING FREE TEXT BOX
71 yo F sent in by ortho of washout of right elbow abscess. 69 yo F sent in by ortho of washout of right elbow abscess. patient seen by ortho, will need OR. patient seen by ID. admit to medicine.

## 2021-04-21 NOTE — ED PROVIDER NOTE - PROGRESS NOTE DETAILS
spoke with ortho PA, aware of patient, will come to see the patient, admit to medicine. I spoke to  for admission. patient seen by ortho and ID, IV keflex and jared.

## 2021-04-21 NOTE — CONSULT NOTE ADULT - ATTENDING COMMENTS
Patient seen and examined in the preop holding area, agree with above note. I previously saw the patient today in my office before diagnosing her with a right elbow septic bursitis with active purulent drainage from an open wound. She was sent to the ED for medical management, ID consultation, and surgical management.     A/P: 70F with Right Elbow Septic Olecranon Bursitis    1. Pain control  2. WBAT RUE in soft dressing  3. NPO/IVF  4. Hold anticoagulation  5. Follow up ID recommendations  6. Plan for OR today for I&D of septic olecranon bursa

## 2021-04-21 NOTE — CONSULT NOTE ADULT - SUBJECTIVE AND OBJECTIVE BOX
Pt Name: PATRIA MCLAIN    MRN: [id number]      Patient seen and examined at bedside. comfortable in bed, pain controlled. Patient states she hit her elbow approx 2 weeks ago, said it was "fine for a few days" and then became infected. Patient states she was on PO abx, followed up in Dr. Fischer's office today and was sent to ER to add on for sugery today. NO other complaints at this time.    HEALTH ISSUES - PROBLEM Dx:      .      REVIEW OF SYSTEMS      General: denie sfever/chills	    Respiratory and Thorax: alison sob  	  Cardiovascular:	denies cp        Musculoskeletal:	 see HPI    Neurological:	denies numbness/tingling      ROS is otherwise negative.    PAST MEDICAL & SURGICAL HISTORY:  PAST MEDICAL & SURGICAL HISTORY:  Neuropathy    Neurogenic bladder    History of back surgery        Allergies: No Known Allergies      Medications:     FAMILY HISTORY:  : non-contributory    Social History:     Ambulation: Walking independently [ ] With Cane [ ] With Walker [ ]  Bedbound [ ]               PHYSICAL EXAM:    Vital Signs Last 24 Hrs  T(C): 36.9 (21 Apr 2021 10:58), Max: 36.9 (21 Apr 2021 10:58)  T(F): 98.4 (21 Apr 2021 10:58), Max: 98.4 (21 Apr 2021 10:58)  HR: 76 (21 Apr 2021 10:58) (76 - 76)  BP: 145/82 (21 Apr 2021 10:58) (145/82 - 145/82)  BP(mean): --  RR: 18 (21 Apr 2021 10:58) (18 - 18)  SpO2: 97% (21 Apr 2021 10:58) (97% - 97%)  Daily Height in cm: 167.64 (21 Apr 2021 10:58)    Daily     Appearance: Alert, responsive, in no acute distress.    Neurological: Sensation is grossly intact to light touch. 5/5 motor function of all extremities. No focal deficits or weaknesses found.    Skin: see RUE exam    Vascular: 2+ distal pulses. Cap refill < 2 sec. No signs of venous insuffiency or stasis. No extremity ulcerations. No cyanosis.    Musculoskeletal:             Right Upper Extremity: Ace bandage dressing C/D/I. REmoved for exam, + gross erythema to elbow. 2 pustules with + expressible drainage. + ROM elbow with no pain. + WF/WE. + ROM phalanges. SILT. radial pulse 2+.        A/P:  Pt is a  70y Female with right elbow septic bursitis    PLAN:   D/W Dr. Fischer  keep NPO  pre op labs/testing  admit to medicine  ID consult  plan for OR today Pt Name: PATRIA MCLAIN    MRN: [id number]      Patient seen and examined at bedside. comfortable in bed, pain controlled. Patient states she hit her elbow approx 2 weeks ago, said it was "fine for a few days" and then became infected. Patient states she was on PO abx, followed up in Dr. Fischer's office today and was sent to ER to add on for sugery today. NO other complaints at this time.    HEALTH ISSUES - PROBLEM Dx:      .      REVIEW OF SYSTEMS      General: denie sfever/chills	    Respiratory and Thorax: alison sob  	  Cardiovascular:	denies cp        Musculoskeletal:	 see HPI    Neurological:	denies numbness/tingling      ROS is otherwise negative.    PAST MEDICAL & SURGICAL HISTORY:  PAST MEDICAL & SURGICAL HISTORY:  Neuropathy    Neurogenic bladder    History of back surgery        Allergies: No Known Allergies      Medications:     FAMILY HISTORY:  : non-contributory    Social History:     Ambulation: Walking independently [ ] With Cane [ ] With Walker [ ]  Bedbound [ ]               PHYSICAL EXAM:    Vital Signs Last 24 Hrs  T(C): 36.9 (21 Apr 2021 10:58), Max: 36.9 (21 Apr 2021 10:58)  T(F): 98.4 (21 Apr 2021 10:58), Max: 98.4 (21 Apr 2021 10:58)  HR: 76 (21 Apr 2021 10:58) (76 - 76)  BP: 145/82 (21 Apr 2021 10:58) (145/82 - 145/82)  BP(mean): --  RR: 18 (21 Apr 2021 10:58) (18 - 18)  SpO2: 97% (21 Apr 2021 10:58) (97% - 97%)  Daily Height in cm: 167.64 (21 Apr 2021 10:58)    Daily     Appearance: Alert, responsive, in no acute distress.    Neurological: Sensation is grossly intact to light touch. 5/5 motor function of all extremities. No focal deficits or weaknesses found.    Skin: see RUE exam    Vascular: 2+ distal pulses. Cap refill < 2 sec. No signs of venous insuffiency or stasis. No extremity ulcerations. No cyanosis.    Musculoskeletal:             Right Upper Extremity: Ace bandage dressing C/D/I. REmoved for exam, + gross erythema to elbow. 2 pustules with + expressible drainage. + ROM elbow with no pain. + WF/WE. + ROM phalanges. SILT. radial pulse 2+.        A/P:  Pt is a  70y Female with right elbow septic bursitis    PLAN:   D/W Dr. Fischer  keep NPO  pre op labs/testing  admit to medicine  ID consult  IV abx  plan for OR today

## 2021-04-21 NOTE — H&P ADULT - HISTORY OF PRESENT ILLNESS
Ms Rodriguez is a 71 yo F with hx of OA and back surgery with complications now Wheel chair bound  Ms Rodriguez is a 69 yo F with hx of OA and back surgery resulting in incontinence and now Wheel chair bound who presented to the ER for right elbow infection. Patient reports about 2 weeks ago she scraoed her right elbow on her wheelchair when she was getting up. She developed an abrasion and then swelling. Later it became painful, swollen and red. She was seen in the ER last week and sent on oral Keflex. She then developed a painful blister. She was seen by Orthopedics who recommends wash out and admission for IV antibiotics. She denies, fever, chills, shortness of breath, chest pain, palpitations, nausea, vomiting or abdominal pain.   IN the ED she was seen by orthopedics and infectious disease. She was started on IV antibiotics and is planned to go to the OR later today.     Patient denies cardiac disease, kidney problem and diabetes. Patient states she does not walk but she wheels herself in the wheel chair without experiencing chest pain or shortness of breath.

## 2021-04-21 NOTE — ED PROVIDER NOTE - OBJECTIVE STATEMENT
69 yo F hx of osteoarthritis, back surgery, right leg surgery send in by ortho Dr. Fischer for right elbow abscess. patient report hitting her right elbow about 2 weeks ago and started to get red and infected. Patient was given iv abx in the ED and home on keflex. Patient seen in office now with ken rajan, sent in for wash out. no fever. no chest pain.

## 2021-04-21 NOTE — ED PROVIDER NOTE - CARE PLAN
Principal Discharge DX:	Abscess of elbow   Principal Discharge DX:	Septic bursitis of elbow, right

## 2021-04-21 NOTE — DISCHARGE NOTE PROVIDER - PROVIDER TOKENS
PROVIDER:[TOKEN:[[id number]]] PROVIDER:[TOKEN:[[id number]],FOLLOWUP:[1-3 days]],PROVIDER:[TOKEN:[[id number]],FOLLOWUP:[1 week]]

## 2021-04-21 NOTE — ED PROVIDER NOTE - NS ED ROS FT
Review of Systems  •	CONSTITUTIONAL - no  fever, no diaphoresis, no weight change  •	SKIN - no rash  •	HEMATOLOGIC - no bleeding, no bruising  •	EYES - no eye pain, no blurred vision  •	ENT - no change in hearing, no pain  •	RESPIRATORY - no shortness of breath, no cough  •	CARDIAC - no chest pain, no palpitations  •	GI - no abd pain, no nausea, no vomiting, no diarrhea, no constipation, no bleeding  •	GENITO-URINARY - no discharge, no dysuria; no hematuria,   •	ENDO - no polydipsia, no polyuria, no heat/no cold intolerance  •	MUSCULOSKELETAL - (+) right elbow redness and swelling (+) abscess of right elbow  •	NEUROLOGIC - no weakness, no headache, no anesthesia, no paresthesias  •	PSYCH - no anxiety, non suicidal, non homicidal, no hallucination, no depression

## 2021-04-21 NOTE — H&P ADULT - NSHPPHYSICALEXAM_GEN_ALL_CORE
PHYSICAL EXAM:  Constitutional: No acute distress, alert and oriented by 3  HEENT: AT/NC, EOMI, PERRLA, Normal conjunctiva, no pharyngeal erythema, moist oral mucosa  Respiratory: CTA BL, equal breath sounds, no crackles or wheezing  Cardiovascular: RRR, no edema  Gastrointestinal: soft, Non-tender, Non-distended + Bowel sounds, no rebound or guarding  Musculoskeletal: right elbow wrapped in ace, LE bent at knees  Neurological: CN 2-12 grossly intact  Skin: warm, dry and intact  Psychiatric: normal mood and affect

## 2021-04-21 NOTE — DISCHARGE NOTE PROVIDER - NSDCCPTREATMENT_GEN_ALL_CORE_FT
PRINCIPAL PROCEDURE  Procedure: Incision and drainage, olecranon bursa  Findings and Treatment:

## 2021-04-21 NOTE — ED ADULT NURSE NOTE - INTERVENTIONS DEFINITIONS
[place] to call system/Call bell, personal items and telephone within reach/Instruct patient to call for assistance/Room bathroom lighting operational

## 2021-04-21 NOTE — ED ADULT NURSE NOTE - OBJECTIVE STATEMENT
pt awake, alert and oriented x3 c/o right elbow infection.  pt states she banged her elbow on her wheelchair 2 weeks ago, had a small abrasion at the time which has becoming infected.  was started on oral abx with no improvement.  respirations even and unlabored.  denies chest pain.  limited ROM to b/l legs chronic. npo since last night.

## 2021-04-21 NOTE — DISCHARGE NOTE PROVIDER - NSDCCPCAREPLAN_GEN_ALL_CORE_FT
PRINCIPAL DISCHARGE DIAGNOSIS  Diagnosis: Septic bursitis of elbow, right  Assessment and Plan of Treatment:        PRINCIPAL DISCHARGE DIAGNOSIS  Diagnosis: Septic bursitis of elbow, right  Assessment and Plan of Treatment: Please continue with wound vac care. Please follow up with Orthopedics and Infectious disease upon discharge.

## 2021-04-21 NOTE — DISCHARGE NOTE PROVIDER - NSDCMRMEDTOKEN_GEN_ALL_CORE_FT
CeleBREX 200 mg oral capsule: 1 cap(s) orally once a day  Lexapro 20 mg oral tablet: 1 tab(s) orally once a day  Neurontin 300 mg oral capsule: 1 cap(s) orally 3 times a day  Urecholine 10 mg oral tablet: 1 tab(s) orally 3 times a day   CeleBREX 200 mg oral capsule: 1 cap(s) orally once a day  doxycycline monohydrate 100 mg oral capsule: 1 cap(s) orally every 12 hours  Lexapro 20 mg oral tablet: 1 tab(s) orally once a day  Neurontin 300 mg oral capsule: 1 cap(s) orally 3 times a day  Physical therapy:   Urecholine 10 mg oral tablet: 1 tab(s) orally 3 times a day

## 2021-04-21 NOTE — DISCHARGE NOTE PROVIDER - NSDCQMAMI_CARD_ALL_CORE
Assessment:  fear of pooping - improving    Plan:  would continue miralax for a couple more months  F/u TBD     For urgent problems after 5pm or on weekends, please call [phone] and the  will put you in touch with the GI physician on call.      No

## 2021-04-21 NOTE — H&P ADULT - ASSESSMENT
71 yo F with hx of OA and back surgery resulting in incontinence and now Wheel chair bound who presented to the ER for right elbow infection.     Right elbow septic bursitis  failed outpt Keflex  Xray No acute radiographic findings and no change, if there is continued clinical concern of abscess follow-up MRI can be ordered  EKG sinus rhtynm without any st or twave changes  - Ortho consulted for OR today  - ID consulted continue Vanco and Cefazolin  - follow cx  - pain control Tylenol, Tramadol and morphine  - PT/OT post op per Orhto      Urinary Incontinence  - urocholine 10mg tid    Hx of OA  - prn pain control  - Resume celebrex and gabapentin    Wheel Chair bound  - supportive care    Dvt ppx: on hold until procedure, resume post op once ok with ortho.    69 yo F with hx of OA and back surgery resulting in incontinence and now Wheel chair bound who presented to the ER for right elbow infection.     Right elbow septic bursitis  failed outpt Keflex  Xray No acute radiographic findings and no change, if there is continued clinical concern of abscess follow-up MRI can be ordered  EKG sinus rhtynm without any st or twave changes  - Ortho consulted for OR today  - ID consulted continue Vanco and Cefazolin  - follow cx  - pain control Tylenol, Tramadol and morphine  - PT/OT post op per Orhto  - RCRI 0 points, No absolute contraindication to planned procedure.     Urinary Incontinence  - urecholine 10mg tid    Hx of OA  - prn pain control  - Resume celebrex and gabapentin    Wheel Chair bound  - supportive care    Dvt ppx: on hold until procedure, resume post op once ok with ortho.    71 yo F with hx of OA and back surgery resulting in incontinence and now Wheel chair bound who presented to the ER for right elbow infection.     Right elbow septic bursitis  failed outpt Keflex  Xray No acute radiographic findings and no change, if there is continued clinical concern of abscess follow-up MRI can be ordered  EKG sinus rhtynm without any st or twave changes  - Ortho consulted for OR today  - ID consulted continue Vanco and Cefazolin  - follow cx  - pain control Tylenol, Tramadol and morphine  - PT/OT post op per Orhto  - follow blood cx  - RCRI 0 points, No absolute contraindication to planned procedure.     Urinary Incontinence  - urecholine 10mg tid    Hx of OA  - prn pain control  - Resume celebrex and gabapentin    Wheel Chair bound  - supportive care    Dvt ppx: on hold until procedure, resume post op once ok with ortho.

## 2021-04-21 NOTE — CONSULT NOTE ADULT - SUBJECTIVE AND OBJECTIVE BOX
[facility]  INFECTIOUS DISEASES AND INTERNAL MEDICINE at [place]  =======================================================  Kashif Ojeda MD  Diplomates American Board of Internal Medicine and Infectious Diseases  Tel  [phone]  Fax [phone]  =======================================================    [facility]-[id number]  PATRIA MCLAIN   HPI:  This 70 y.o F with Neuropathy, Neurogenic bladder, History of back surgery, who had trauma to the RIGHT elbow 2 weeks ago.  She reports of skin abrasion.  said it was "fine for a few days" and then became infected.  She presented tot  ER on 4/14/21, seen by ortho team.  Patient states she was on PO Keflex after.  She followed up in Dr. Fischer's office today and was sent to ER to add on for surgery today. NO other complaints at this time.  Reports hx of MRSA infection in the past. unclear time/date.     denies allergies to antibiotics  Reports that she is wheelchair bound.     I have personally reviewed the labs and data; pertinent labs and data are listed in this note; please see below.   =======================================================  Past Medical & Surgical Hx:  =====================  PAST MEDICAL & SURGICAL HISTORY:  Neuropathy  Neurogenic bladder  History of back surgery      Problem List:  ==========  HEALTH ISSUES - PROBLEM Dx:    Social Hx:  =======  no toxic habits currently    FAMILY HISTORY:  no significant family history of immunosuppressive disorders in mother or father   =======================================================    REVIEW OF SYSTEMS:  CONSTITUTIONAL:  No Fever or chills  HEENT:  No diplopia or blurred vision.  No earache, sore throat or runny nose.  CARDIOVASCULAR:  No pressure, squeezing, strangling, tightness, heaviness or aching about the chest, neck, axilla or epigastrium.  RESPIRATORY:  No cough, shortness of breath  GASTROINTESTINAL:  No nausea, vomiting or diarrhea.  GENITOURINARY:  No dysuria, frequency or urgency. No Blood in urine  MUSCULOSKELETAL:  no joint aches, no muscle pain  SKIN:  No change in skin, hair or nails.  NEUROLOGIC:  No Headaches, seizures or weakness.  PSYCHIATRIC:  No disorder of thought or mood.  ENDOCRINE:  No heat or cold intolerance  HEMATOLOGICAL:  No easy bruising or bleeding.    =======================================================  Allergies  No Known Allergies      Antibiotics:  ceFAZolin   IVPB 2000 milliGRAM(s) IV Intermittent every 8 hours  vancomycin  IVPB 1000 milliGRAM(s) IV Intermittent every 12 hours    Other medications:       ceFAZolin   IVPB   100 mL/Hr IV Intermittent (04-14-21 @ 22:43)  vancomycin  IVPB   250 mL/Hr IV Intermittent (04-21-21 @ 12:27)      ======================================================  Physical Exam:  ============  T(F): 98.4 (21 Apr 2021 10:58), Max: 98.4 (21 Apr 2021 10:58)  HR: 76 (21 Apr 2021 10:58)  BP: 145/82 (21 Apr 2021 10:58)  RR: 18 (21 Apr 2021 10:58)  SpO2: 97% (21 Apr 2021 10:58) (97% - 97%)  temp max in last 48H T(F): , Max: 98.4 (04-21-21 @ 10:58)Height (cm): 167.6 (04-21-21 @ 10:58)  Weight (kg): 68 (04-21-21 @ 10:58)  BMI (kg/m2): 24.2 (04-21-21 @ 10:58)  BSA (m2): 1.77 (04-21-21 @ 10:58)    General:  No acute distress.  Eye: Pupils are equal, round and reactive to light, Extraocular movements are intact, Normal conjunctiva.  HENT: Normocephalic, Oral mucosa is moist, No pharyngeal erythema, No sinus tenderness.  Neck: Supple, No lymphadenopathy.  Respiratory: Lungs are clear to auscultation, Respirations are non-labored.  Cardiovascular: Normal rate, Regular rhythm,   Gastrointestinal: Soft, Non-tender, Non-distended, Normal bowel sounds.  Genitourinary: No costovertebral angle tenderness.  Lymphatics: No lymphadenopathy neck,   Musculoskeletal: Normal range of motion, Normal strength.  RIGHT elbow with erythema, and drainage of pus from lateral aspect of elbow  deformity to the RIGHT anterior leg  Integumentary: No rash.  Neurologic: Alert, Oriented, No focal deficits, Cranial Nerves II-XII are grossly intact.  Psychiatric: Appropriate mood & affect.    =======================================================  Labs:                        12.6   6.50  )-----------( 331      ( 21 Apr 2021 12:27 )             40.6     04-21    138  |  100  |  14.0  ----------------------------<  81  4.7   |  29.0  |  0.32<L>    Ca    9.3      21 Apr 2021 12:27    TPro  7.5  /  Alb  3.7  /  TBili  0.2<L>  /  DBili  x   /  AST  19  /  ALT  17  /  AlkPhos  106  04-21      Creatinine, Serum: 0.32 mg/dL (04-21-21 @ 12:27)    C-Reactive Protein, Serum: 15 mg/L (04-21-21 @ 12:27)  C-Reactive Protein, Serum: 85 mg/L (04-14-21 @ 22:50)    Sedimentation Rate, Erythrocyte: 42 mm/hr (04-14-21 @ 22:50)          < from: Xray Elbow AP + Lateral + Oblique, Right (04.21.21 @ 11:46) >     EXAM:  ELBOW-RIGHT                          PROCEDURE DATE:  04/21/2021          INTERPRETATION:  Clinical history: 70-year-old female, right elbow infection.    Four views of the right elbow are compared to 4/14/2021 and demonstrate mild degenerative change with no fracture, dislocation, radiopaque foreign body, gross cortical destruction or soft tissue emphysema.    A punctate radiopacity is projected posterior to the proximal ulnar shaft on the lateral view.    IMPRESSION::  No acute radiographic findings and no change, if there is continued clinical concern of abscess follow-up MRI can be ordered           MICHAEL DELGADILLO DO; Attending Radiologist  This document has been electronically signed. Apr 21 2021  1:06PM    < end of copied text >

## 2021-04-21 NOTE — DISCHARGE NOTE PROVIDER - HOSPITAL COURSE
Brief hospital course:   70 year old female with OA, wheelchair bound due to kyphosis with multiple back surgeries and sacral ulcer (s/p plastic surgery with good healing) urinary incontinence admitted for I&D of Rt olecranon bursitis with sinus tracts. s/p OR 4/21 with drain placement which was removed 4/22. Intraop cultures are growing Staph aureus (pending oxacillin sens). ID is following the pt for IV antibiotics       Discharge exam :  CONSTITUTIONAL: NAD, well-developed, well-groomed  ENMT: Moist oral mucosa, no pharyngeal injection or exudates; normal dentition  RESPIRATORY: Normal respiratory effort; lungs are clear to auscultation bilaterally  CARDIOVASCULAR: Regular rate and rhythm, normal S1 and S2, no murmur/rub/gallop; No lower extremity edema; Peripheral pulses are 2+ bilaterally  ABDOMEN: Nontender to palpation, normoactive bowel sounds, no rebound/guarding; No hepatosplenomegaly  MUSCLOSKELETAL:  Normal gait; no clubbing or cyanosis of digits; no joint swelling or tenderness to palpation  PSYCH: A+O to person, place, and time; affect appropriate  NEUROLOGY: CN 2-12 are intact and symmetric; no gross sensory deficits;   SKIN: No rashes; no palpable lesions      Pertinent labs and imaging :  4/21/21 XR Elbow (Rt)  No acute radiographic findings and no change, if there is continued clinical concern of abscess follow-up MRI can be ordered    4/22/21 CXR  IMPRESSION: No acute cardiopulmonary disease process.    4/21/22 Olecranon bursa swab (intraop):   Staphylococcus aureus        Time spent on dsc : 40 minutes                                        Brief hospital course:   70 year old female with OA, wheelchair bound due to kyphosis with multiple back surgeries and sacral ulcer (s/p plastic surgery with good healing) urinary incontinence admitted for I&D of Rt olecranon bursitis with sinus tracts. s/p OR 4/21 with drain placement which was removed 4/22. Intraop cultures are growing Staph aureus (pending oxacillin sens). ID is following the pt for IV antibiotics       Discharge exam :  Constitutional: No acute distress, alert and oriented   HEENT: AT/NC, EOMI, PERRLA, Normal conjunctiva, no pharyngeal erythema, moist oral mucosa  Respiratory: CTA BL, equal breath sounds, no crackles or wheezing  Cardiovascular: RRR, no edema  Gastrointestinal: soft, Non-tender, Non-distended + Bowel sounds, no rebound or guarding  Musculoskeletal: right elbow wrapped in ace, LE with chronic femoral /tibial /patellar deformities at knees, b/l ch arthritic changes fingers   Neurological: CN 2-12 grossly intact, bed/wheelchair bound   Skin: warm, dry and intact  Psychiatric: normal mood, pleasant       Pertinent labs and imaging :  4/21/21 XR Elbow (Rt)  No acute radiographic findings and no change, if there is continued clinical concern of abscess follow-up MRI can be ordered    4/22/21 CXR  IMPRESSION: No acute cardiopulmonary disease process.    4/21/22 Olecranon bursa swab (intraop):   Staphylococcus aureus        Time spent on dsc : 40 minutes                                         70 year old female with OA, wheelchair bound due to kyphosis with multiple back surgeries and sacral ulcer (s/p plastic surgery with good healing) urinary incontinence admitted for I&D of Rt olecranon bursitis with sinus tracts. s/p OR 4/21 with drain placement which was removed 4/22. Cx from the surgery showed to be MRSA. Patient underwent a right elbow bursa washout and placement of a wound vac and a splint. Patient was optimized for discharge by Orthopedics. As per ID, patient was initially on IV abx of vancomycin and then transitioned to PO doxycyline. PT evaluated patient and recommended Home PT but patient refused. Will provide script for outpatient PT. Medically stable for discharge.       Pertinent labs and imaging :  4/21/21 XR Elbow (Rt)  No acute radiographic findings and no change, if there is continued clinical concern of abscess follow-up MRI can be ordered    4/22/21 CXR  IMPRESSION: No acute cardiopulmonary disease process.    4/21/22 Olecranon bursa swab (intraop):   MRSA        Time spent on dsc : 40 minutes

## 2021-04-21 NOTE — ED PROVIDER NOTE - PHYSICAL EXAMINATION
VITAL SIGNS: I have reviewed nursing notes and confirm.  CONSTITUTIONAL: Well-developed; well-nourished; in no acute distress.  SKIN: Skin exam is warm and dry, no acute rash.  HEAD: Normocephalic; atraumatic.  EYES: PERRL, EOM intact; conjunctiva and sclera clear.  ENT: No nasal discharge; airway clear. Throat clear.  NECK: Supple; non tender.    CARD: S1, S2 normal; Regular rate and rhythm.  RESP: No wheezes,  no rales or rhonchi.   ABD:  soft; non-distended; non-tender;   EXT:  (+) erythema of right elbow (+) abscess with purulent drainage.   NEURO: Alert, oriented. Grossly unremarkable. No focal deficits. no facial droop, moves all extremities,  normal gait   PSYCH: Cooperative, appropriate.

## 2021-04-21 NOTE — DISCHARGE NOTE PROVIDER - CARE PROVIDER_API CALL
Larry Fischer (DO)  Orthopedics  [address] [place] [id number]  Phone: [phone]  Fax: [phone]  Follow Up Time:    Larry Fischer (DO)  Orthopedics  [address] [place] [address]  Phone: [phone]  Fax: [phone]  Follow Up Time: 1-3 days    Jonas Florence  INFECTIOUS DISEASE  [address] [facility], [facility] [address]  [place] [address]  Phone: [phone]  Fax: [phone]  Follow Up Time: 1 week

## 2021-04-21 NOTE — CONSULT NOTE ADULT - ASSESSMENT
This 70 y.o F with Neuropathy, Neurogenic bladder, History of back surgery, who had trauma to the RIGHT elbow 2 weeks ago.  She reports of skin abrasion.  said it was "fine for a few days" and then became infected.  She presented tot  ER on 4/14/21, seen by ortho team.  Patient states she was on PO Keflex after.  She followed up in Dr. Fischer's office today and was sent to ER to add on for surgery today. NO other complaints at this time.  Reports hx of MRSA infection in the past. unclear time/date.     denies allergies to antibiotics  Reports that she is wheelchair bound.     Impression:  right elbow infection  right elbow pain  failure of antibiotics as outpatient  hx of MRSA      Plan:  - continue current antibiotics:  ceFAZolin   IVPB 2000 milliGRAM(s) IV Intermittent every 8 hours  vancomycin  IVPB 1000 milliGRAM(s) IV Intermittent every 12 hours    - history of MRSA is of concern  may explain failure of keflex as outpatient.     send culture from wound.   and OR specimens    pain control as per primary team.     - follow up all outstanding cultures  - trend temperature and WBC curve  - repeat cultures from blood and all sources if febrile.

## 2021-04-22 LAB
ALBUMIN SERPL ELPH-MCNC: 3.3 G/DL — SIGNIFICANT CHANGE UP (ref 3.3–5.2)
ALP SERPL-CCNC: 90 U/L — SIGNIFICANT CHANGE UP (ref 40–120)
ALT FLD-CCNC: 13 U/L — SIGNIFICANT CHANGE UP
ANION GAP SERPL CALC-SCNC: 9 MMOL/L — SIGNIFICANT CHANGE UP (ref 5–17)
ANION GAP SERPL CALC-SCNC: 9 MMOL/L — SIGNIFICANT CHANGE UP (ref 5–17)
AST SERPL-CCNC: 18 U/L — SIGNIFICANT CHANGE UP
BILIRUB SERPL-MCNC: <0.2 MG/DL — LOW (ref 0.4–2)
BUN SERPL-MCNC: 10 MG/DL — SIGNIFICANT CHANGE UP (ref 8–20)
BUN SERPL-MCNC: 10 MG/DL — SIGNIFICANT CHANGE UP (ref 8–20)
CALCIUM SERPL-MCNC: 8.5 MG/DL — LOW (ref 8.6–10.2)
CALCIUM SERPL-MCNC: 8.8 MG/DL — SIGNIFICANT CHANGE UP (ref 8.6–10.2)
CHLORIDE SERPL-SCNC: 102 MMOL/L — SIGNIFICANT CHANGE UP (ref 98–107)
CHLORIDE SERPL-SCNC: 103 MMOL/L — SIGNIFICANT CHANGE UP (ref 98–107)
CO2 SERPL-SCNC: 27 MMOL/L — SIGNIFICANT CHANGE UP (ref 22–29)
CO2 SERPL-SCNC: 27 MMOL/L — SIGNIFICANT CHANGE UP (ref 22–29)
COVID-19 SPIKE DOMAIN AB INTERP: POSITIVE
COVID-19 SPIKE DOMAIN ANTIBODY RESULT: >250 U/ML — HIGH
CREAT SERPL-MCNC: 0.3 MG/DL — LOW (ref 0.5–1.3)
CREAT SERPL-MCNC: 0.32 MG/DL — LOW (ref 0.5–1.3)
GLUCOSE SERPL-MCNC: 122 MG/DL — HIGH (ref 70–99)
GLUCOSE SERPL-MCNC: 124 MG/DL — HIGH (ref 70–99)
HCT VFR BLD CALC: 38.4 % — SIGNIFICANT CHANGE UP (ref 34.5–45)
HCV AB S/CO SERPL IA: 0.17 S/CO — SIGNIFICANT CHANGE UP (ref 0–0.99)
HCV AB SERPL-IMP: SIGNIFICANT CHANGE UP
HGB BLD-MCNC: 11.9 G/DL — SIGNIFICANT CHANGE UP (ref 11.5–15.5)
MAGNESIUM SERPL-MCNC: 2.2 MG/DL — SIGNIFICANT CHANGE UP (ref 1.6–2.6)
MCHC RBC-ENTMCNC: 28.2 PG — SIGNIFICANT CHANGE UP (ref 27–34)
MCHC RBC-ENTMCNC: 31 GM/DL — LOW (ref 32–36)
MCV RBC AUTO: 91 FL — SIGNIFICANT CHANGE UP (ref 80–100)
PLATELET # BLD AUTO: 337 K/UL — SIGNIFICANT CHANGE UP (ref 150–400)
POTASSIUM SERPL-MCNC: 4.5 MMOL/L — SIGNIFICANT CHANGE UP (ref 3.5–5.3)
POTASSIUM SERPL-MCNC: 5 MMOL/L — SIGNIFICANT CHANGE UP (ref 3.5–5.3)
POTASSIUM SERPL-SCNC: 4.5 MMOL/L — SIGNIFICANT CHANGE UP (ref 3.5–5.3)
POTASSIUM SERPL-SCNC: 5 MMOL/L — SIGNIFICANT CHANGE UP (ref 3.5–5.3)
PROT SERPL-MCNC: 6.8 G/DL — SIGNIFICANT CHANGE UP (ref 6.6–8.7)
RBC # BLD: 4.22 M/UL — SIGNIFICANT CHANGE UP (ref 3.8–5.2)
RBC # FLD: 13.8 % — SIGNIFICANT CHANGE UP (ref 10.3–14.5)
SARS-COV-2 IGG+IGM SERPL QL IA: >250 U/ML — HIGH
SARS-COV-2 IGG+IGM SERPL QL IA: POSITIVE
SODIUM SERPL-SCNC: 138 MMOL/L — SIGNIFICANT CHANGE UP (ref 135–145)
SODIUM SERPL-SCNC: 139 MMOL/L — SIGNIFICANT CHANGE UP (ref 135–145)
VANCOMYCIN TROUGH SERPL-MCNC: 12.9 UG/ML — SIGNIFICANT CHANGE UP (ref 10–20)
WBC # BLD: 7.69 K/UL — SIGNIFICANT CHANGE UP (ref 3.8–10.5)
WBC # FLD AUTO: 7.69 K/UL — SIGNIFICANT CHANGE UP (ref 3.8–10.5)

## 2021-04-22 PROCEDURE — 99232 SBSQ HOSP IP/OBS MODERATE 35: CPT

## 2021-04-22 PROCEDURE — 99497 ADVNCD CARE PLAN 30 MIN: CPT

## 2021-04-22 PROCEDURE — 99233 SBSQ HOSP IP/OBS HIGH 50: CPT

## 2021-04-22 RX ORDER — HEPARIN SODIUM 5000 [USP'U]/ML
5000 INJECTION INTRAVENOUS; SUBCUTANEOUS EVERY 8 HOURS
Refills: 0 | Status: DISCONTINUED | OUTPATIENT
Start: 2021-04-22 | End: 2021-04-25

## 2021-04-22 RX ADMIN — ESCITALOPRAM OXALATE 20 MILLIGRAM(S): 10 TABLET, FILM COATED ORAL at 11:49

## 2021-04-22 RX ADMIN — Medication 250 MILLIGRAM(S): at 11:51

## 2021-04-22 RX ADMIN — Medication 10 MILLIGRAM(S): at 05:12

## 2021-04-22 RX ADMIN — Medication 325 MILLIGRAM(S): at 11:48

## 2021-04-22 RX ADMIN — Medication 10 MILLIGRAM(S): at 21:11

## 2021-04-22 RX ADMIN — GABAPENTIN 300 MILLIGRAM(S): 400 CAPSULE ORAL at 13:09

## 2021-04-22 RX ADMIN — HEPARIN SODIUM 5000 UNIT(S): 5000 INJECTION INTRAVENOUS; SUBCUTANEOUS at 21:11

## 2021-04-22 RX ADMIN — CELECOXIB 200 MILLIGRAM(S): 200 CAPSULE ORAL at 13:00

## 2021-04-22 RX ADMIN — TRAMADOL HYDROCHLORIDE 100 MILLIGRAM(S): 50 TABLET ORAL at 23:17

## 2021-04-22 RX ADMIN — CELECOXIB 200 MILLIGRAM(S): 200 CAPSULE ORAL at 11:48

## 2021-04-22 RX ADMIN — Medication 10 MILLIGRAM(S): at 13:09

## 2021-04-22 RX ADMIN — Medication 100 MILLIGRAM(S): at 13:09

## 2021-04-22 RX ADMIN — GABAPENTIN 300 MILLIGRAM(S): 400 CAPSULE ORAL at 21:10

## 2021-04-22 RX ADMIN — Medication 100 MILLIGRAM(S): at 05:12

## 2021-04-22 RX ADMIN — Medication 100 MILLIGRAM(S): at 21:11

## 2021-04-22 RX ADMIN — Medication 250 MILLIGRAM(S): at 23:20

## 2021-04-22 RX ADMIN — GABAPENTIN 300 MILLIGRAM(S): 400 CAPSULE ORAL at 05:12

## 2021-04-22 NOTE — PROGRESS NOTE ADULT - CONVERSATION DETAILS
Pt wishes to remain be code, she is focused on quality of life and does not wish to remain in a vegitative state if something happens to her, however she would life to have her daughter decide in such an instance.

## 2021-04-22 NOTE — PHYSICAL THERAPY INITIAL EVALUATION ADULT - LIGHT TOUCH SENSATION, RLE, REHAB EVAL
INDICATION: Chronic low back pain. History of degenerative disc disease and osteoarthritis

with disc herniations at L3-L4 and L4-L5.



COMPARISON: Lumbar spine November 27, 2017; MRI lumbar spine February 10, 2017

 

TECHNIQUE: Noncontrast axial source images was performed from the thoracolumbar junction

to the sacrum. Coronal and and sagittal reformatted images were generated.



FINDINGS: 



Vertebrae: There is no fracture or acute focal bony lesion. There is mild multilevel

degenerative spurring and degenerative disc disease (see below)



Alignment: The lumbar vertebrae are normally aligned.



Central Canal: There is minor narrowing at L2-L3 with mild broad-based circumferential

bulging the disc. There is minor facet overgrowth. L3-L4 shows a small left paracentral

disc herniation leading to a mild decrease in AP diameter canal. There is no direct nerve

root impingement. L4-L5 shows moderate circumferential bulging the disc mildly eccentric

to the left. There is mild bilateral foraminal narrowing. L5-S1 shows mild broad-based

circumferential bulging the disc. Note that MR imaging is a more sensitive method to

evaluate the canal and foramina.



Intervertebral disc spaces: The disc spaces are maintained.



Soft tissues: The paravertebral soft tissues are normal.



Other: None



IMPRESSION: MILD TO MODERATE DEGENERATIVE DISEASE AS DESCRIBED. PLEASE FOR ALSO TO EARLIER

MRI REPORT. mild impairment

## 2021-04-23 LAB
-  AMPICILLIN/SULBACTAM: SIGNIFICANT CHANGE UP
-  AMPICILLIN/SULBACTAM: SIGNIFICANT CHANGE UP
-  CEFAZOLIN: SIGNIFICANT CHANGE UP
-  CEFAZOLIN: SIGNIFICANT CHANGE UP
-  CLINDAMYCIN: SIGNIFICANT CHANGE UP
-  CLINDAMYCIN: SIGNIFICANT CHANGE UP
-  DAPTOMYCIN: SIGNIFICANT CHANGE UP
-  DAPTOMYCIN: SIGNIFICANT CHANGE UP
-  ERYTHROMYCIN: SIGNIFICANT CHANGE UP
-  ERYTHROMYCIN: SIGNIFICANT CHANGE UP
-  GENTAMICIN: SIGNIFICANT CHANGE UP
-  GENTAMICIN: SIGNIFICANT CHANGE UP
-  LINEZOLID: SIGNIFICANT CHANGE UP
-  LINEZOLID: SIGNIFICANT CHANGE UP
-  OXACILLIN: SIGNIFICANT CHANGE UP
-  OXACILLIN: SIGNIFICANT CHANGE UP
-  PENICILLIN: SIGNIFICANT CHANGE UP
-  PENICILLIN: SIGNIFICANT CHANGE UP
-  RIFAMPIN: SIGNIFICANT CHANGE UP
-  RIFAMPIN: SIGNIFICANT CHANGE UP
-  TETRACYCLINE: SIGNIFICANT CHANGE UP
-  TETRACYCLINE: SIGNIFICANT CHANGE UP
-  TRIMETHOPRIM/SULFAMETHOXAZOLE: SIGNIFICANT CHANGE UP
-  TRIMETHOPRIM/SULFAMETHOXAZOLE: SIGNIFICANT CHANGE UP
-  VANCOMYCIN: SIGNIFICANT CHANGE UP
-  VANCOMYCIN: SIGNIFICANT CHANGE UP
ANION GAP SERPL CALC-SCNC: 8 MMOL/L — SIGNIFICANT CHANGE UP (ref 5–17)
BUN SERPL-MCNC: 23 MG/DL — HIGH (ref 8–20)
CALCIUM SERPL-MCNC: 8.1 MG/DL — LOW (ref 8.6–10.2)
CHLORIDE SERPL-SCNC: 102 MMOL/L — SIGNIFICANT CHANGE UP (ref 98–107)
CO2 SERPL-SCNC: 29 MMOL/L — SIGNIFICANT CHANGE UP (ref 22–29)
CREAT SERPL-MCNC: 0.39 MG/DL — LOW (ref 0.5–1.3)
GLUCOSE SERPL-MCNC: 97 MG/DL — SIGNIFICANT CHANGE UP (ref 70–99)
HCT VFR BLD CALC: 33.7 % — LOW (ref 34.5–45)
HGB BLD-MCNC: 10.4 G/DL — LOW (ref 11.5–15.5)
MCHC RBC-ENTMCNC: 28.2 PG — SIGNIFICANT CHANGE UP (ref 27–34)
MCHC RBC-ENTMCNC: 30.9 GM/DL — LOW (ref 32–36)
MCV RBC AUTO: 91.3 FL — SIGNIFICANT CHANGE UP (ref 80–100)
METHOD TYPE: SIGNIFICANT CHANGE UP
METHOD TYPE: SIGNIFICANT CHANGE UP
PLATELET # BLD AUTO: 303 K/UL — SIGNIFICANT CHANGE UP (ref 150–400)
POTASSIUM SERPL-MCNC: 4.3 MMOL/L — SIGNIFICANT CHANGE UP (ref 3.5–5.3)
POTASSIUM SERPL-SCNC: 4.3 MMOL/L — SIGNIFICANT CHANGE UP (ref 3.5–5.3)
RBC # BLD: 3.69 M/UL — LOW (ref 3.8–5.2)
RBC # FLD: 14.1 % — SIGNIFICANT CHANGE UP (ref 10.3–14.5)
SODIUM SERPL-SCNC: 139 MMOL/L — SIGNIFICANT CHANGE UP (ref 135–145)
WBC # BLD: 10.95 K/UL — HIGH (ref 3.8–10.5)
WBC # FLD AUTO: 10.95 K/UL — HIGH (ref 3.8–10.5)

## 2021-04-23 PROCEDURE — 99232 SBSQ HOSP IP/OBS MODERATE 35: CPT

## 2021-04-23 RX ADMIN — GABAPENTIN 300 MILLIGRAM(S): 400 CAPSULE ORAL at 21:30

## 2021-04-23 RX ADMIN — ESCITALOPRAM OXALATE 20 MILLIGRAM(S): 10 TABLET, FILM COATED ORAL at 13:01

## 2021-04-23 RX ADMIN — HEPARIN SODIUM 5000 UNIT(S): 5000 INJECTION INTRAVENOUS; SUBCUTANEOUS at 05:20

## 2021-04-23 RX ADMIN — Medication 250 MILLIGRAM(S): at 11:11

## 2021-04-23 RX ADMIN — Medication 10 MILLIGRAM(S): at 21:29

## 2021-04-23 RX ADMIN — Medication 10 MILLIGRAM(S): at 05:20

## 2021-04-23 RX ADMIN — GABAPENTIN 300 MILLIGRAM(S): 400 CAPSULE ORAL at 05:20

## 2021-04-23 RX ADMIN — CELECOXIB 200 MILLIGRAM(S): 200 CAPSULE ORAL at 13:00

## 2021-04-23 RX ADMIN — CELECOXIB 200 MILLIGRAM(S): 200 CAPSULE ORAL at 14:00

## 2021-04-23 RX ADMIN — TRAMADOL HYDROCHLORIDE 100 MILLIGRAM(S): 50 TABLET ORAL at 00:00

## 2021-04-23 RX ADMIN — Medication 250 MILLIGRAM(S): at 21:29

## 2021-04-23 RX ADMIN — TRAMADOL HYDROCHLORIDE 100 MILLIGRAM(S): 50 TABLET ORAL at 21:33

## 2021-04-23 RX ADMIN — TRAMADOL HYDROCHLORIDE 100 MILLIGRAM(S): 50 TABLET ORAL at 22:33

## 2021-04-23 RX ADMIN — Medication 100 MILLIGRAM(S): at 05:24

## 2021-04-23 RX ADMIN — HEPARIN SODIUM 5000 UNIT(S): 5000 INJECTION INTRAVENOUS; SUBCUTANEOUS at 21:30

## 2021-04-23 RX ADMIN — Medication 325 MILLIGRAM(S): at 13:00

## 2021-04-23 RX ADMIN — GABAPENTIN 300 MILLIGRAM(S): 400 CAPSULE ORAL at 13:00

## 2021-04-23 RX ADMIN — Medication 10 MILLIGRAM(S): at 13:00

## 2021-04-23 RX ADMIN — HEPARIN SODIUM 5000 UNIT(S): 5000 INJECTION INTRAVENOUS; SUBCUTANEOUS at 13:00

## 2021-04-23 NOTE — OCCUPATIONAL THERAPY INITIAL EVALUATION ADULT - ADDITIONAL COMMENTS
Pt lives in house with ramp to enter and no steps inside; bedroom and bathroom are on main level. Bathroom has shower stall with door and grab bars. Bathroom has comfort height toilet with grab bars. Pt owns wheelchair, shower chair, reacher, shoe horn. Pt has adjustable bed at home. Pt is right handed. Pt does not drive. Pt's daughter works days and son-in-law works nights so someone is typically always home with patient to assist as needed. Pt is independent at wheelchair level with all transfers and ADLs prior to admission.

## 2021-04-23 NOTE — CDI QUERY NOTE - NSCDIOTHERTXTBX2_GEN_ALL_CORE_FT
Please clarify and include the following additional detail:    Type of debridement (excisional vs. non-excisional debridement):        Excisional:   the removal of necrotic devitalized tissue or slough by means of cutting away of tissue, slough, or necrosis.        Non-excisional:  non-operative brushing, irrigation, scrubbing, washing (minor removal of loose fragments).      Can you please clarify depth of tissue debrided?  A. Debridement (excisional vs. non-excisional debridement) including the level of fascia  B. Debridement (excisional vs. non-excisional debridement) including the level of subcutaneous tissue  C. Debridement (excisional vs. non-excisional debridement) including the level of muscle  D. Debridement (excisional vs. non-excisional debridement) including the level of olecranon process  E. Debridement (excisional vs. non-excisional debridement) including the level of other, please specify  F. Not clinically significant      A statement referring to “down to the level of ___” does not describe the specificity for coding.      Chart Documentations:    [facility] Operative Report :  The procedure began by creating an incision in line with the laterally based sinus, which was ellipsed and excised in its entirety.  Incision was sharply carried down through the skin and subcutaneous layer down to the fascia and the base of the olecranon.  At this point, blunt dissection was used to break up any pockets of purulent material.  Three wound cultures were used to obtain specimens for culture.  Once all of the pockets were exposed, the area was debrided using a rongeur of any necrotic or infected tissue.  At this point, we began to irrigate the area copiously with approximately 6 L of normal saline solution using cystoscopy tubing.  Once adequately irrigated, we again debrided any residual necrotic tissue that we found.  The second draining sinus was then ellipsed and excised in its entirety.    Once we were satisfied with our debridement, the elbow was irrigated one more time, and we began our closure.  The subcutaneous layers were closed with buried 3-0 Monocryl subcutaneous sutures.  The skin was closed with 3-0 nylon interrupted sutures in simple fashion.  At the distal aspect of our lateral incision, a Penrose drain was placed to allow purulent fluid to continue to drain out of the pocket of potential space.  After the closure, a sterile dressing was then applied to the operative area.  The patient was then placed in a soft dressing and a sling for comfort.

## 2021-04-23 NOTE — OCCUPATIONAL THERAPY INITIAL EVALUATION ADULT - LEVEL OF INDEPENDENCE, OT EVAL
sponge in semi-katz position in bed; pt able to weightshift and lift LEs to complete all tasks/independent

## 2021-04-23 NOTE — CHART NOTE - NSCHARTNOTEFT_GEN_A_CORE
Antibody Interpretation: Anti-K    Patient is a 69 yo F with hx of OA and back surgery resulting in incontinence and now Wheel chair bound who presented to the ER for right elbow infection. Patient reports about 2 weeks ago she scraped her right elbow on her wheelchair when she was getting up. She developed an abrasion and then swelling. Later it became painful, swollen and red. She was seen in the ER last week and sent on oral Keflex. She then developed a painful blister. She was seen by Orthopedics who recommends wash out and admission for IV antibiotics. She denies, fever, chills, shortness of breath, chest pain, palpitations, nausea, vomiting or abdominal pain.   Blood sample received on 04/21/21 demonstrates that the patient is blood group O Rh(D) positive. The antibody screen is positive and anti-K is identified in patient's plasma. Anti-K is a clinically significant antibody that can cause acute and delayed hemolytic transfusion reactions. Crossmatch compatible red blood cells through the [facility] phase of testing, negative for the K antigen, must be selected for transfusion. Please allow sufficient time for pre-transfusion blood bank workup.

## 2021-04-23 NOTE — OCCUPATIONAL THERAPY INITIAL EVALUATION ADULT - LEVEL OF INDEPENDENCE:TOILET, OT EVAL
pt with urinary incontinence during session; pt reports this happens all the time at home; CNA made aware and in at end of session to provide assistance

## 2021-04-23 NOTE — CDI QUERY NOTE - NSCDIOTHERTXTBX_GEN_ALL_CORE_HH
This patient is documented to have had an Incision and drainage (I&D) performed.  Further clarification is needed on the level of tissue incised into.    Can the deepest level of tissue incised into be clarified?    A. Incision into the level of subcutaneous layer, fascia and to the base of the olecranon process  B. Incision into the level of the subcutaneous tissue  C. Incision into the level of the fascia  D. Incision into the level of the muscle  E. Other (please specify)  F. Not clinically significant    Note: Level of "fat" and "fatty tissue" is non-specific in ICD-10  A statement referring to “down to the level of ___” does not describe the specificity for coding.        SUPPORTING DOCUMENTATION  / EVIDENCE:    [facility] Operative report:  The procedure began by creating an incision in line with the laterally based sinus, which was ellipsed and excised in its entirety.  Incision was sharply carried down through the skin and subcutaneous layer down to the fascia and the base of the olecranon.    4/21/21 Brief Operative Note:  Pre-Op, Post-Op and Procedure Selector:  ·  PRE-OP DIAGNOSIS:  Septic olecranon bursitis, right 21-Apr-2021 18:25:43  Larry Fischer  ·  POST-OP DIAGNOSIS:  Septic olecranon bursitis, right 21-Apr-2021 18:25:54  Larry Fischer  ·  PROCEDURES:  Incision and drainage, olecranon bursa 21-Apr-2021 18:25:29  Larry Fischer       Operative Findings:  · Operative Findings	right elbow septic olecranon bursitis with 2 draining sinus tracts

## 2021-04-23 NOTE — OCCUPATIONAL THERAPY INITIAL EVALUATION ADULT - SENSORY TESTS
pt denies any recent changes in sensation however pt reports chronic bilateral LE sensory impairments (right>left)

## 2021-04-24 ENCOUNTER — TRANSCRIPTION ENCOUNTER (OUTPATIENT)
Age: 71
End: 2021-04-24

## 2021-04-24 LAB
ANION GAP SERPL CALC-SCNC: 12 MMOL/L — SIGNIFICANT CHANGE UP (ref 5–17)
BUN SERPL-MCNC: 22 MG/DL — HIGH (ref 8–20)
CALCIUM SERPL-MCNC: 8.3 MG/DL — LOW (ref 8.6–10.2)
CHLORIDE SERPL-SCNC: 102 MMOL/L — SIGNIFICANT CHANGE UP (ref 98–107)
CO2 SERPL-SCNC: 28 MMOL/L — SIGNIFICANT CHANGE UP (ref 22–29)
CREAT SERPL-MCNC: 0.36 MG/DL — LOW (ref 0.5–1.3)
GLUCOSE SERPL-MCNC: 78 MG/DL — SIGNIFICANT CHANGE UP (ref 70–99)
HCT VFR BLD CALC: 36.3 % — SIGNIFICANT CHANGE UP (ref 34.5–45)
HGB BLD-MCNC: 11.1 G/DL — LOW (ref 11.5–15.5)
MCHC RBC-ENTMCNC: 28.3 PG — SIGNIFICANT CHANGE UP (ref 27–34)
MCHC RBC-ENTMCNC: 30.6 GM/DL — LOW (ref 32–36)
MCV RBC AUTO: 92.6 FL — SIGNIFICANT CHANGE UP (ref 80–100)
PLATELET # BLD AUTO: 294 K/UL — SIGNIFICANT CHANGE UP (ref 150–400)
POTASSIUM SERPL-MCNC: 4.8 MMOL/L — SIGNIFICANT CHANGE UP (ref 3.5–5.3)
POTASSIUM SERPL-SCNC: 4.8 MMOL/L — SIGNIFICANT CHANGE UP (ref 3.5–5.3)
RBC # BLD: 3.92 M/UL — SIGNIFICANT CHANGE UP (ref 3.8–5.2)
RBC # FLD: 14.5 % — SIGNIFICANT CHANGE UP (ref 10.3–14.5)
SODIUM SERPL-SCNC: 142 MMOL/L — SIGNIFICANT CHANGE UP (ref 135–145)
VANCOMYCIN TROUGH SERPL-MCNC: 18.9 UG/ML — SIGNIFICANT CHANGE UP (ref 10–20)
WBC # BLD: 8.91 K/UL — SIGNIFICANT CHANGE UP (ref 3.8–10.5)
WBC # FLD AUTO: 8.91 K/UL — SIGNIFICANT CHANGE UP (ref 3.8–10.5)

## 2021-04-24 PROCEDURE — 99232 SBSQ HOSP IP/OBS MODERATE 35: CPT

## 2021-04-24 RX ADMIN — Medication 325 MILLIGRAM(S): at 11:51

## 2021-04-24 RX ADMIN — GABAPENTIN 300 MILLIGRAM(S): 400 CAPSULE ORAL at 05:31

## 2021-04-24 RX ADMIN — Medication 10 MILLIGRAM(S): at 13:35

## 2021-04-24 RX ADMIN — HEPARIN SODIUM 5000 UNIT(S): 5000 INJECTION INTRAVENOUS; SUBCUTANEOUS at 13:35

## 2021-04-24 RX ADMIN — CELECOXIB 200 MILLIGRAM(S): 200 CAPSULE ORAL at 11:51

## 2021-04-24 RX ADMIN — Medication 10 MILLIGRAM(S): at 05:31

## 2021-04-24 RX ADMIN — ESCITALOPRAM OXALATE 20 MILLIGRAM(S): 10 TABLET, FILM COATED ORAL at 11:51

## 2021-04-24 RX ADMIN — HEPARIN SODIUM 5000 UNIT(S): 5000 INJECTION INTRAVENOUS; SUBCUTANEOUS at 05:31

## 2021-04-24 RX ADMIN — Medication 250 MILLIGRAM(S): at 23:15

## 2021-04-24 RX ADMIN — GABAPENTIN 300 MILLIGRAM(S): 400 CAPSULE ORAL at 23:15

## 2021-04-24 RX ADMIN — Medication 250 MILLIGRAM(S): at 09:28

## 2021-04-24 RX ADMIN — TRAMADOL HYDROCHLORIDE 100 MILLIGRAM(S): 50 TABLET ORAL at 23:13

## 2021-04-24 RX ADMIN — HEPARIN SODIUM 5000 UNIT(S): 5000 INJECTION INTRAVENOUS; SUBCUTANEOUS at 23:15

## 2021-04-24 RX ADMIN — Medication 10 MILLIGRAM(S): at 23:15

## 2021-04-24 RX ADMIN — GABAPENTIN 300 MILLIGRAM(S): 400 CAPSULE ORAL at 13:35

## 2021-04-25 PROCEDURE — 99232 SBSQ HOSP IP/OBS MODERATE 35: CPT

## 2021-04-25 PROCEDURE — 23931 I&D UPR A/E BURSA: CPT | Mod: 78,RT

## 2021-04-25 RX ORDER — HYDROMORPHONE HYDROCHLORIDE 2 MG/ML
0.5 INJECTION INTRAMUSCULAR; INTRAVENOUS; SUBCUTANEOUS
Refills: 0 | Status: DISCONTINUED | OUTPATIENT
Start: 2021-04-25 | End: 2021-04-25

## 2021-04-25 RX ORDER — HEPARIN SODIUM 5000 [USP'U]/ML
5000 INJECTION INTRAVENOUS; SUBCUTANEOUS EVERY 8 HOURS
Refills: 0 | Status: DISCONTINUED | OUTPATIENT
Start: 2021-04-26 | End: 2021-04-26

## 2021-04-25 RX ORDER — SODIUM CHLORIDE 9 MG/ML
1000 INJECTION, SOLUTION INTRAVENOUS
Refills: 0 | Status: DISCONTINUED | OUTPATIENT
Start: 2021-04-25 | End: 2021-04-25

## 2021-04-25 RX ADMIN — Medication 10 MILLIGRAM(S): at 22:39

## 2021-04-25 RX ADMIN — GABAPENTIN 300 MILLIGRAM(S): 400 CAPSULE ORAL at 06:00

## 2021-04-25 RX ADMIN — TRAMADOL HYDROCHLORIDE 50 MILLIGRAM(S): 50 TABLET ORAL at 16:23

## 2021-04-25 RX ADMIN — Medication 10 MILLIGRAM(S): at 06:00

## 2021-04-25 RX ADMIN — GABAPENTIN 300 MILLIGRAM(S): 400 CAPSULE ORAL at 22:39

## 2021-04-25 RX ADMIN — Medication 250 MILLIGRAM(S): at 22:28

## 2021-04-25 RX ADMIN — HYDROMORPHONE HYDROCHLORIDE 0.5 MILLIGRAM(S): 2 INJECTION INTRAMUSCULAR; INTRAVENOUS; SUBCUTANEOUS at 15:45

## 2021-04-25 RX ADMIN — Medication 250 MILLIGRAM(S): at 09:51

## 2021-04-25 RX ADMIN — TRAMADOL HYDROCHLORIDE 100 MILLIGRAM(S): 50 TABLET ORAL at 22:38

## 2021-04-25 RX ADMIN — HEPARIN SODIUM 5000 UNIT(S): 5000 INJECTION INTRAVENOUS; SUBCUTANEOUS at 06:00

## 2021-04-25 NOTE — BRIEF OPERATIVE NOTE - OPERATION/FINDINGS
right elbow septic olecranon bursitis with 2 draining sinus tracts
right elbow septic bursitis with surgical wound dehiscence

## 2021-04-25 NOTE — BRIEF OPERATIVE NOTE - NSICDXBRIEFPOSTOP_GEN_ALL_CORE_FT
POST-OP DIAGNOSIS:  Septic olecranon bursitis, right 21-Apr-2021 18:25:54  Larry Fischer  Wound dehiscence 25-Apr-2021 15:18:19  Larry Fischer  
POST-OP DIAGNOSIS:  Septic olecranon bursitis, right 21-Apr-2021 18:25:54  Larry Fischer

## 2021-04-25 NOTE — BRIEF OPERATIVE NOTE - NSICDXBRIEFPREOP_GEN_ALL_CORE_FT
PRE-OP DIAGNOSIS:  Septic olecranon bursitis, right 21-Apr-2021 18:25:43  Larry Fischer  
PRE-OP DIAGNOSIS:  Septic olecranon bursitis, right 21-Apr-2021 18:25:43  Larry Fischer  Wound dehiscence 25-Apr-2021 15:18:35  Larry Fischer

## 2021-04-25 NOTE — BRIEF OPERATIVE NOTE - NSICDXBRIEFPROCEDURE_GEN_ALL_CORE_FT
PROCEDURES:  Incision and drainage, olecranon bursa 21-Apr-2021 18:25:29  Larry Fischer  
PROCEDURES:  Incision and drainage, olecranon bursa 21-Apr-2021 18:25:29  Larry Fischer

## 2021-04-26 ENCOUNTER — TRANSCRIPTION ENCOUNTER (OUTPATIENT)
Age: 71
End: 2021-04-26

## 2021-04-26 VITALS
TEMPERATURE: 98 F | SYSTOLIC BLOOD PRESSURE: 107 MMHG | HEART RATE: 88 BPM | RESPIRATION RATE: 18 BRPM | OXYGEN SATURATION: 95 % | DIASTOLIC BLOOD PRESSURE: 66 MMHG

## 2021-04-26 LAB
ALBUMIN SERPL ELPH-MCNC: 3.1 G/DL — LOW (ref 3.3–5.2)
ALP SERPL-CCNC: 88 U/L — SIGNIFICANT CHANGE UP (ref 40–120)
ALT FLD-CCNC: 16 U/L — SIGNIFICANT CHANGE UP
ANION GAP SERPL CALC-SCNC: 11 MMOL/L — SIGNIFICANT CHANGE UP (ref 5–17)
AST SERPL-CCNC: 23 U/L — SIGNIFICANT CHANGE UP
BASOPHILS # BLD AUTO: 0.03 K/UL — SIGNIFICANT CHANGE UP (ref 0–0.2)
BASOPHILS NFR BLD AUTO: 0.4 % — SIGNIFICANT CHANGE UP (ref 0–2)
BILIRUB SERPL-MCNC: <0.2 MG/DL — LOW (ref 0.4–2)
BUN SERPL-MCNC: 11 MG/DL — SIGNIFICANT CHANGE UP (ref 8–20)
CALCIUM SERPL-MCNC: 8.7 MG/DL — SIGNIFICANT CHANGE UP (ref 8.6–10.2)
CHLORIDE SERPL-SCNC: 104 MMOL/L — SIGNIFICANT CHANGE UP (ref 98–107)
CO2 SERPL-SCNC: 25 MMOL/L — SIGNIFICANT CHANGE UP (ref 22–29)
CREAT SERPL-MCNC: 0.24 MG/DL — LOW (ref 0.5–1.3)
CULTURE RESULTS: SIGNIFICANT CHANGE UP
EOSINOPHIL # BLD AUTO: 0.21 K/UL — SIGNIFICANT CHANGE UP (ref 0–0.5)
EOSINOPHIL NFR BLD AUTO: 3.1 % — SIGNIFICANT CHANGE UP (ref 0–6)
GLUCOSE SERPL-MCNC: 82 MG/DL — SIGNIFICANT CHANGE UP (ref 70–99)
HCT VFR BLD CALC: 35.7 % — SIGNIFICANT CHANGE UP (ref 34.5–45)
HGB BLD-MCNC: 11.2 G/DL — LOW (ref 11.5–15.5)
IMM GRANULOCYTES NFR BLD AUTO: 0.7 % — SIGNIFICANT CHANGE UP (ref 0–1.5)
LYMPHOCYTES # BLD AUTO: 1.5 K/UL — SIGNIFICANT CHANGE UP (ref 1–3.3)
LYMPHOCYTES # BLD AUTO: 22.2 % — SIGNIFICANT CHANGE UP (ref 13–44)
MCHC RBC-ENTMCNC: 28.6 PG — SIGNIFICANT CHANGE UP (ref 27–34)
MCHC RBC-ENTMCNC: 31.4 GM/DL — LOW (ref 32–36)
MCV RBC AUTO: 91.1 FL — SIGNIFICANT CHANGE UP (ref 80–100)
MONOCYTES # BLD AUTO: 0.71 K/UL — SIGNIFICANT CHANGE UP (ref 0–0.9)
MONOCYTES NFR BLD AUTO: 10.5 % — SIGNIFICANT CHANGE UP (ref 2–14)
NEUTROPHILS # BLD AUTO: 4.27 K/UL — SIGNIFICANT CHANGE UP (ref 1.8–7.4)
NEUTROPHILS NFR BLD AUTO: 63.1 % — SIGNIFICANT CHANGE UP (ref 43–77)
ORGANISM # SPEC MICROSCOPIC CNT: SIGNIFICANT CHANGE UP
PLATELET # BLD AUTO: 286 K/UL — SIGNIFICANT CHANGE UP (ref 150–400)
POTASSIUM SERPL-MCNC: 4.5 MMOL/L — SIGNIFICANT CHANGE UP (ref 3.5–5.3)
POTASSIUM SERPL-SCNC: 4.5 MMOL/L — SIGNIFICANT CHANGE UP (ref 3.5–5.3)
PROT SERPL-MCNC: 6.3 G/DL — LOW (ref 6.6–8.7)
RBC # BLD: 3.92 M/UL — SIGNIFICANT CHANGE UP (ref 3.8–5.2)
RBC # FLD: 14.6 % — HIGH (ref 10.3–14.5)
SODIUM SERPL-SCNC: 140 MMOL/L — SIGNIFICANT CHANGE UP (ref 135–145)
SPECIMEN SOURCE: SIGNIFICANT CHANGE UP
VANCOMYCIN TROUGH SERPL-MCNC: 24.5 UG/ML — HIGH (ref 10–20)
WBC # BLD: 6.77 K/UL — SIGNIFICANT CHANGE UP (ref 3.8–10.5)
WBC # FLD AUTO: 6.77 K/UL — SIGNIFICANT CHANGE UP (ref 3.8–10.5)

## 2021-04-26 PROCEDURE — 99239 HOSP IP/OBS DSCHRG MGMT >30: CPT

## 2021-04-26 PROCEDURE — 99232 SBSQ HOSP IP/OBS MODERATE 35: CPT

## 2021-04-26 RX ORDER — VANCOMYCIN HCL 1 G
750 VIAL (EA) INTRAVENOUS EVERY 12 HOURS
Refills: 0 | Status: DISCONTINUED | OUTPATIENT
Start: 2021-04-26 | End: 2021-04-26

## 2021-04-26 RX ADMIN — CELECOXIB 200 MILLIGRAM(S): 200 CAPSULE ORAL at 11:20

## 2021-04-26 RX ADMIN — GABAPENTIN 300 MILLIGRAM(S): 400 CAPSULE ORAL at 14:29

## 2021-04-26 RX ADMIN — GABAPENTIN 300 MILLIGRAM(S): 400 CAPSULE ORAL at 04:40

## 2021-04-26 RX ADMIN — HEPARIN SODIUM 5000 UNIT(S): 5000 INJECTION INTRAVENOUS; SUBCUTANEOUS at 14:29

## 2021-04-26 RX ADMIN — Medication 100 MILLIGRAM(S): at 14:29

## 2021-04-26 RX ADMIN — Medication 10 MILLIGRAM(S): at 14:29

## 2021-04-26 RX ADMIN — HEPARIN SODIUM 5000 UNIT(S): 5000 INJECTION INTRAVENOUS; SUBCUTANEOUS at 04:39

## 2021-04-26 RX ADMIN — ESCITALOPRAM OXALATE 20 MILLIGRAM(S): 10 TABLET, FILM COATED ORAL at 11:23

## 2021-04-26 RX ADMIN — CELECOXIB 200 MILLIGRAM(S): 200 CAPSULE ORAL at 11:50

## 2021-04-26 RX ADMIN — Medication 10 MILLIGRAM(S): at 04:40

## 2021-04-26 NOTE — PHYSICAL THERAPY INITIAL EVALUATION ADULT - CRITERIA FOR SKILLED THERAPEUTIC INTERVENTIONS
impairments found
impairments found/functional limitations in following categories/risk reduction/prevention/rehab potential/therapy frequency/predicted duration of therapy intervention/anticipated equipment needs at discharge/anticipated discharge recommendation

## 2021-04-26 NOTE — PROGRESS NOTE ADULT - ASSESSMENT
88 year old female with OA, wheelchair bound due to kyphosis with multiple back surgeries and sacral ulcer (s/p plastic surgery with good healing), urinary incontinence admitted for IV abx following I&D of Rt septic bursitis     Right elbow septic bursitis secondary to MRSA   Ortho s/p I&D (4/21) and s/p Right elbow bursa washout and placement of wound vac and splint  ID recs appreciated  OR cx show MRSA   DC cefazolin and C/W Vanco   Follows cultures  DC likely with PICC line    Urinary Incontinence  urecholine 10mg tid    OA  prn pain control  Continue celebrex and gabapentin    Wheel Chair bound  Supportive care  PT recs appreciated    Dvt ppx: Heparin s/c    Patient reports that she will update her family  Dispo: DC pending ortho and ID
This 70 y.o F with Neuropathy, Neurogenic bladder, History of back surgery, who had trauma to the RIGHT elbow 2 weeks ago.  She reports of skin abrasion.  said it was "fine for a few days" and then became infected.  She presented tot  ER on 4/14/21, seen by ortho team.  Patient states she was on PO Keflex after.  She followed up in Dr. Fischer's office today and was sent to ER to add on for surgery today. NO other complaints at this time.  Reports hx of MRSA infection in the past. unclear time/date.     denies allergies to antibiotics  Reports that she is wheelchair bound.     Impression:  right elbow infection  right elbow pain  failure of antibiotics as outpatient  hx of MRSA    Plan:  - continue current antibiotics:  - stop Cefazolin  - continue vancomycin for now  - history of MRSA is of concern  may explain failure of keflex as outpatient.     culture  from  OR specimens sent  Awaiting identification/ susceptibility      pain control as per primary team.     anticipate discharge on Monday with IV antibiotics at home 
88 year old female with OA, wheelchair bound due to kyphosis with multiple back surgeries and sacral ulcer (s/p plastic surgery with good healing), urinary incontinence admitted for IV abx following I&D of Rt septic bursitis     Right elbow septic bursitis secondary to MRSA   Ortho s/p I&D (4/21)  ID recs appreciated  OR cx show MRSA   DC cefazolin and C/W Vanco   Follows cultures  DC likely with PICC line    Urinary Incontinence  urecholine 10mg tid    OA  prn pain control  Continue celebrex and gabapentin    Wheel Chair bound  Supportive care  PT recs appreciated    Dvt ppx: Heparin s/c    Patient reports that she will update her family  Dispo: Likely DC home on Monday with IV abx.   
88 year old female with OA, wheelchair bound due to kyphosis with multiple back surgeries and sacral ulcer (s/p plastic surgery with good healing), urinary incontinence admitted for IV abx following I&D of Rt septic bursitis     Right elbow septic bursitis secondary to MRSA   Ortho s/p I&D (4/21)  ID recs appreciated  OR cx show MRSA   DC cefazolin and C/W Vanco   Follows cultures till complete (currently Staph aureus, oxacillin sens pending)    Urinary Incontinence  urecholine 10mg tid    OA  prn pain control  Continue celebrex and gabapentin    Wheel Chair bound  Supportive care  PT recs appreciated    Dvt ppx: Heparin s/c    Patient reports that she will update her family  Dispo: Likely DC home on Monday with IV abx.   
This 70 y.o F with Neuropathy, Neurogenic bladder, History of back surgery, who had trauma to the RIGHT elbow 2 weeks ago.  She reports of skin abrasion.  said it was "fine for a few days" and then became infected.  She presented tot  ER on 4/14/21, seen by ortho team.  Patient states she was on PO Keflex after.  She followed up in Dr. Fischer's office today and was sent to ER to add on for surgery today. NO other complaints at this time.  Reports hx of MRSA infection in the past. unclear time/date.     denies allergies to antibiotics  Reports that she is wheelchair bound.     Impression:  right elbow infection  right elbow pain  failure of antibiotics as outpatient  CA-MRSA  infection    Plan:  - BCX ngtd  - CA-MRSA  infection  - Cefazolin stopped  - in view of Susceptibilities, will Stop Vancomycin  - start Doxycycline 100mg PO BID   x 28 day course THRU 5/24/2021         pain control as per primary team.     anticipate discharge Today.   WILL OPT for PO antibiotics base on susceptibilities  WOUND care to be arranged    d/w Dr Delgado      No further specific infectious disease recommendations. Will be available as needed.    Thank you for allowing me to participate in the care of your patient.   Feel free to call me back for any new concerns.      can follow with me in 1 - 2 weeks in office.   
88 year old female with OA, wheelchair bound due to kyphosis with multiple back surgeries and sacral ulcer (s/p plastic surgery with good healing), urinary incontinence admitted for IV abx following I&D of Rt septic bursitis     Right elbow septic bursitis  Ortho s/p I&D (4/21)  ID on board  Cultures in progress  Continue Cefazolin and Vancomycin  Follows cultures till complete (currently Staph aureus, oxacillin sens pending)    Urinary Incontinence  urecholine 10mg tid    OA  prn pain control  Continue celebrex and gabapentin    Wheel Chair bound  Supportive care  PT      Dvt ppx: Heparin s/c  
This 70 y.o F with Neuropathy, Neurogenic bladder, History of back surgery, who had trauma to the RIGHT elbow 2 weeks ago.  She reports of skin abrasion.  said it was "fine for a few days" and then became infected.  She presented tot  ER on 4/14/21, seen by ortho team.  Patient states she was on PO Keflex after.  She followed up in Dr. Fischer's office today and was sent to ER to add on for surgery today. NO other complaints at this time.  Reports hx of MRSA infection in the past. unclear time/date.     denies allergies to antibiotics  Reports that she is wheelchair bound.     Impression:  right elbow infection  right elbow pain  failure of antibiotics as outpatient  hx of MRSA      Plan:  - continue current antibiotics:  ceFAZolin   IVPB 2000 milliGRAM(s) IV Intermittent every 8 hours  vancomycin  IVPB 1000 milliGRAM(s) IV Intermittent every 12 hours     - history of MRSA is of concern  may explain failure of keflex as outpatient.     culture  from  OR specimens sent    pain control as per primary team.     - follow up all outstanding cultures  - trend temperature and WBC curve  - repeat cultures from blood and all sources if febrile.  
This 70 y.o F with Neuropathy, Neurogenic bladder, History of back surgery, who had trauma to the RIGHT elbow 2 weeks ago.  She reports of skin abrasion.  said it was "fine for a few days" and then became infected.  She presented tot  ER on 4/14/21, seen by ortho team.  Patient states she was on PO Keflex after.  She followed up in Dr. Fischer's office today and was sent to ER to add on for surgery today. NO other complaints at this time.  Reports hx of MRSA infection in the past. unclear time/date.     denies allergies to antibiotics  Reports that she is wheelchair bound.     Impression:  right elbow infection  right elbow pain  failure of antibiotics as outpatient  hx of MRSA    Plan:  - BCX ngtd  - Or CX MRSA  - Cefazolin stopped  - continue vancomycin -last trough 18        pain control as per primary team.     anticipate discharge on Monday with IV antibiotics at home     d/w Dr Danny Valero f/u
70 year old female with OA, wheelchair bound due to kyphosis with multiple back surgeries and sacral ulcer (s/p plastic surgery with good healing), urinary incontinence admitted for IV abx following I&D of Rt septic bursitis     Right elbow septic bursitis  Ortho  ; s/p I&D  ID on board  Cultures in progress  Continue Cefazolin and Vancomycin  Follows cultures till complete (may be low yield as pt was on abx)    Urinary Incontinence  urecholine 10mg tid    OA  prn pain control  Continue celebrex and gabapentin    Wheel Chair bound  - supportive care    Dvt ppx: Heparin s/c

## 2021-04-26 NOTE — PHYSICAL THERAPY INITIAL EVALUATION ADULT - PERTINENT HX OF CURRENT PROBLEM, REHAB EVAL
88 year old female with OA, wheelchair bound due to kyphosis with multiple back surgeries and sacral ulcer (s/p plastic surgery with good healing), urinary incontinence admitted for IV abx following I&D of Rt septic bursitis
as per medical chart:  Patient reports about 2 weeks ago she scraoed her right elbow on her wheelchair when she was getting up. She developed an abrasion and then swelling. Later it became painful, swollen and red.

## 2021-04-26 NOTE — PHYSICAL THERAPY INITIAL EVALUATION ADULT - ACTIVE RANGE OF MOTION EXAMINATION, REHAB EVAL
Unable to fully extend bilateral knees/bilateral  lower extremity Active ROM was WFL (within functional limits)/deficits as listed below
decreased active and passive ROM at all major joints in  Rene LE,  Rene knees contractures,/bilateral upper extremity Active ROM was WFL (within functional limits)

## 2021-04-26 NOTE — PROGRESS NOTE ADULT - NSICDXPILOT_GEN_ALL_CORE
[place]

## 2021-04-26 NOTE — PROGRESS NOTE ADULT - SUBJECTIVE AND OBJECTIVE BOX
[facility] Division of Hospital Medicine    Chief Complaint:  Patient is a 71y old  Female who presents with a chief complaint of Right Elbow pain (24 Apr 2021 09:03)      SUBJECTIVE / OVERNIGHT EVENTS:  Patient was seen and examined at bedside. States that her pain in right arm is well controlled. Offers no additional complaints today.   Patient denies chest pain, SOB, abd pain, N/V, fever, chills, dysuria or any other complaints. All remainder ROS negative.     MEDICATIONS  (STANDING):  aspirin 325 milliGRAM(s) Oral daily  bethanechol 10 milliGRAM(s) Oral three times a day  celecoxib 200 milliGRAM(s) Oral daily  escitalopram 20 milliGRAM(s) Oral daily  gabapentin 300 milliGRAM(s) Oral three times a day  heparin   Injectable 5000 Unit(s) SubCutaneous every 8 hours  vancomycin  IVPB 1000 milliGRAM(s) IV Intermittent every 12 hours    MEDICATIONS  (PRN):  acetaminophen   Tablet .. 650 milliGRAM(s) Oral every 4 hours PRN Mild Pain (1 - 3)  calcium carbonate    500 mG (Tums) Chewable 3 Tablet(s) Chew every 6 hours PRN Dyspepsia  senna 2 Tablet(s) Oral at bedtime PRN Constipation  traMADol 50 milliGRAM(s) Oral every 6 hours PRN Moderate Pain (4 - 6)  traMADol 100 milliGRAM(s) Oral every 6 hours PRN Severe Pain (7 - 10)        I&O's Summary    23 Apr 2021 07:01  -  24 Apr 2021 07:00  --------------------------------------------------------  IN: 0 mL / OUT: 400 mL / NET: -400 mL        PHYSICAL EXAM:  Vital Signs Last 24 Hrs  T(C): 36.7 (24 Apr 2021 07:41), Max: 36.8 (23 Apr 2021 11:06)  T(F): 98.1 (24 Apr 2021 07:41), Max: 98.3 (23 Apr 2021 20:47)  HR: 85 (24 Apr 2021 07:41) (74 - 85)  BP: 108/65 (24 Apr 2021 07:41) (102/61 - 122/81)  BP(mean): --  RR: 20 (24 Apr 2021 07:41) (18 - 20)  SpO2: 94% (24 Apr 2021 07:41) (93% - 97%)      Constitutional: NAD, Resting  ENT: Supple, No JVD  Lungs: CTA B/L, Non-labored breathing  Cardio: RRR, S1/S2, No murmur  Abdomen: Soft, Nontender, Nondistended; Bowel sounds present  Extremities: No calf tenderness, No pitting edema, Right upper extremity is wrapped  Musculoskeletal:   No clubbing or cyanosis of digits; no joint swelling or tenderness to palpation  Psych: Calm, cooperative affect appropriate  Neuro: Awake and alert  Skin: No rashes; no palpable lesions    LABS:                        10.4   10.95 )-----------( 303      ( 23 Apr 2021 06:27 )             33.7     04-24    142  |  102  |  22.0<H>  ----------------------------<  78  4.8   |  28.0  |  0.36<L>    Ca    8.3<L>      24 Apr 2021 08:10                Culture - Surgical Swab (collected 22 Apr 2021 00:41)  Source: .Surgical Swab right elbow ole cranon bursa #3  Preliminary Report (23 Apr 2021 17:27):    Few Methicillin resistant Staphylococcus aureus  Organism: Methicillin resistant Staphylococcus aureus (23 Apr 2021 17:27)  Organism: Methicillin resistant Staphylococcus aureus (23 Apr 2021 17:27)    Culture - Surgical Swab (collected 22 Apr 2021 00:41)  Source: .Surgical Swab right elbow ole cranon bursa #2  Preliminary Report (23 Apr 2021 17:28):    Few Methicillin resistant Staphylococcus aureus    See previous culture [id number]    Culture - Surgical Swab (collected 22 Apr 2021 00:41)  Source: .Surgical Swab right elbow ole cranon bursa #1  Preliminary Report (23 Apr 2021 17:34):    Few Methicillin resistant Staphylococcus aureus  Organism: Methicillin resistant Staphylococcus aureus (23 Apr 2021 17:34)  Organism: Methicillin resistant Staphylococcus aureus (23 Apr 2021 17:34)    Culture - Blood (collected 21 Apr 2021 13:21)  Source: .Blood Blood-Peripheral  Preliminary Report (23 Apr 2021 15:00):    No growth at 48 hours    Culture - Blood (collected 21 Apr 2021 13:19)  Source: .Blood Blood-Peripheral  Preliminary Report (23 Apr 2021 15:00):    No growth at 48 hours      CAPILLARY BLOOD GLUCOSE            RADIOLOGY REVIEWED  
[facility]  INFECTIOUS DISEASES AND INTERNAL MEDICINE at [place]  =======================================================  Kashif Ojeda MD  Diplomates American Board of Internal Medicine and Infectious Diseases  Tel  [phone]  Fax [phone]  =======================================================    [id number]  PATRIA MCLAIN   follow up: RIGHT elbow infection    s/p washout of elbow joint  cultures sent; MRSA  feels swelling has improved today    Social Hx:  =======  no toxic habits currently    FAMILY HISTORY:  no significant family history of immunosuppressive disorders in mother or father   =======================================================    REVIEW OF SYSTEMS:  CONSTITUTIONAL:  No Fever or chills  HEENT:  No diplopia or blurred vision.  No earache, sore throat or runny nose.  CARDIOVASCULAR:  No pressure, squeezing, strangling, tightness, heaviness or aching about the chest, neck, axilla or epigastrium.  RESPIRATORY:  No cough, shortness of breath  GASTROINTESTINAL:  No nausea, vomiting or diarrhea.  GENITOURINARY:  No dysuria, frequency or urgency. No Blood in urine  MUSCULOSKELETAL:  no joint aches, no muscle pain  SKIN:  No change in skin, hair or nails.  NEUROLOGIC:  No Headaches, seizures or weakness.  PSYCHIATRIC:  No disorder of thought or mood.  ENDOCRINE:  No heat or cold intolerance  HEMATOLOGICAL:  No easy bruising or bleeding.    =======================================================  Allergies  No Known Allergies     ======================================================  Physical Exam:  ============     General:  No acute distress.  Eye: Pupils are equal, round and reactive to light, Extraocular movements are intact, Normal conjunctiva.  HENT: Normocephalic, Oral mucosa is moist, No pharyngeal erythema, No sinus tenderness.  Neck: Supple, No lymphadenopathy.  Respiratory: Lungs are clear to auscultation, Respirations are non-labored.  Cardiovascular: Normal rate, Regular rhythm,   Gastrointestinal: Soft, Non-tender, Non-distended, Normal bowel sounds.  Genitourinary: No costovertebral angle tenderness.  Lymphatics: No lymphadenopathy neck,   Musculoskeletal: Normal range of motion, Normal strength.  RIGHT elbow +swelling and erythema, nontender, sutures intact  deformity to the RIGHT anterior leg  Integumentary: No rash.  Neurologic: Alert, Oriented, No focal deficits, Cranial Nerves II-XII are grossly intact.  Psychiatric: Appropriate mood & affect.    =======================================================  Vitals:  ============  T(F): 98.2 (23 Apr 2021 11:06), Max: 98.4 (22 Apr 2021 23:13)  HR: 78 (23 Apr 2021 11:06)  BP: 102/61 (23 Apr 2021 11:06)  RR: 18 (23 Apr 2021 11:06)  SpO2: 97% (23 Apr 2021 11:06) (94% - 97%)  temp max in last 48H T(F): , Max: 98.4 (04-22-21 @ 11:02)    =======================================================  Current Antibiotics:  vancomycin  IVPB 1000 milliGRAM(s) IV Intermittent every 12 hours    Other medications:  aspirin 325 milliGRAM(s) Oral daily  bethanechol 10 milliGRAM(s) Oral three times a day  celecoxib 200 milliGRAM(s) Oral daily  escitalopram 20 milliGRAM(s) Oral daily  gabapentin 300 milliGRAM(s) Oral three times a day  heparin   Injectable 5000 Unit(s) SubCutaneous every 8 hours      =======================================================  Labs:                                             11.1   8.91  )-----------( 294      ( 24 Apr 2021 08:10 )             36.3       04-24    142  |  102  |  22.0<H>  ----------------------------<  78  4.8   |  28.0  |  0.36<L>    Ca    8.3<L>      24 Apr 2021 08:10                              CAPILLARY BLOOD GLUCOSE                  Culture - Surgical Swab (collected 04-22-21 @ 00:41)  Source: .Surgical Swab right elbow ole cranon bursa #3    Culture - Surgical Swab (collected 04-22-21 @ 00:41)  Source: .Surgical Swab right elbow ole cranon bursa #2    Culture - Surgical Swab (collected 04-22-21 @ 00:41)  Source: .Surgical Swab right elbow ole cranon bursa #1      Creatinine, Serum: 0.39 mg/dL (04-23-21 @ 06:27)  Creatinine, Serum: 0.32 mg/dL (04-22-21 @ 07:23)  Creatinine, Serum: 0.30 mg/dL (04-22-21 @ 07:23)  Creatinine, Serum: 0.32 mg/dL (04-21-21 @ 12:27)    C-Reactive Protein, Serum: 15 mg/L (04-21-21 @ 12:27)  C-Reactive Protein, Serum: 85 mg/L (04-14-21 @ 22:50)    WBC Count: 10.95 K/uL (04-23-21 @ 06:27)  WBC Count: 7.69 K/uL (04-22-21 @ 07:23)  WBC Count: 6.50 K/uL (04-21-21 @ 12:27)    COVID-19 PCR: NotDetec (04-21-21 @ 13:20)    Alkaline Phosphatase, Serum: 90 U/L (04-22-21 @ 07:23)  Alkaline Phosphatase, Serum: 106 U/L (04-21-21 @ 12:27)  Alanine Aminotransferase (ALT/SGPT): 13 U/L (04-22-21 @ 07:23)  Alanine Aminotransferase (ALT/SGPT): 17 U/L (04-21-21 @ 12:27)  Aspartate Aminotransferase (AST/SGOT): 18 U/L (04-22-21 @ 07:23)  Aspartate Aminotransferase (AST/SGOT): 19 U/L (04-21-21 @ 12:27)  Bilirubin Total, Serum: <0.2 mg/dL (04-22-21 @ 07:23)  Bilirubin Total, Serum: 0.2 mg/dL (04-21-21 @ 12:27)      < from: Xray Elbow AP + Lateral + Oblique, Right (04.21.21 @ 11:46) >     EXAM:  ELBOW-RIGHT                          PROCEDURE DATE:  04/21/2021          INTERPRETATION:  Clinical history: 70-year-old female, right elbow infection.    Four views of the right elbow are compared to 4/14/2021 and demonstrate mild degenerative change with no fracture, dislocation, radiopaque foreign body, gross cortical destruction or soft tissue emphysema.    A punctate radiopacity is projected posterior to the proximal ulnar shaft on the lateral view.    IMPRESSION::  No acute radiographic findings and no change, if there is continued clinical concern of abscess follow-up MRI can be ordered      MICHAEL DELGADILLO DO; Attending Radiologist  This document has been electronically signed. Apr 21 2021  1:06PM    < end of copied text >  
Pt Name: PATRIA MCLAIN  MRN: [id number]      Patient is a 70y female s/p washout of right elbow bursa on 4/21, POD #2. Patient seen and examined at bedside, resting comfortably. Pain is being well controlled with current prescribed pain medications. Patient participating in PT. Patient denies fevers, chills, SOB, CP, numbness, tingling, nausea, vomiting, lightheadedness. Patient denies acute motor or sensory changes.       PAST MEDICAL & SURGICAL HISTORY:  Neuropathy    Neurogenic bladder    History of back surgery        Allergies: No Known Allergies      Medications: acetaminophen   Tablet .. 650 milliGRAM(s) Oral every 4 hours PRN  aspirin 325 milliGRAM(s) Oral daily  bethanechol 10 milliGRAM(s) Oral three times a day  calcium carbonate    500 mG (Tums) Chewable 3 Tablet(s) Chew every 6 hours PRN  ceFAZolin   IVPB 2000 milliGRAM(s) IV Intermittent every 8 hours  celecoxib 200 milliGRAM(s) Oral daily  escitalopram 20 milliGRAM(s) Oral daily  gabapentin 300 milliGRAM(s) Oral three times a day  heparin   Injectable 5000 Unit(s) SubCutaneous every 8 hours  senna 2 Tablet(s) Oral at bedtime PRN  traMADol 50 milliGRAM(s) Oral every 6 hours PRN  traMADol 100 milliGRAM(s) Oral every 6 hours PRN  vancomycin  IVPB 1000 milliGRAM(s) IV Intermittent every 12 hours                            10.4   10.95 )-----------( 303      ( 23 Apr 2021 06:27 )             33.7     04-23    139  |  102  |  23.0<H>  ----------------------------<  97  4.3   |  29.0  |  0.39<L>    Ca    8.1<L>      23 Apr 2021 06:27  Mg     2.2     04-22    TPro  6.8  /  Alb  3.3  /  TBili  <0.2<L>  /  DBili  x   /  AST  18  /  ALT  13  /  AlkPhos  90  04-22      PHYSICAL EXAM:    Vital Signs Last 24 Hrs  T(C): 36.8 (23 Apr 2021 04:36), Max: 36.9 (22 Apr 2021 11:02)  T(F): 98.2 (23 Apr 2021 04:36), Max: 98.4 (22 Apr 2021 11:02)  HR: 86 (23 Apr 2021 04:36) (77 - 87)  BP: 108/52 (23 Apr 2021 04:36) (94/63 - 108/52)  BP(mean): --  RR: 18 (23 Apr 2021 04:36) (16 - 20)  SpO2: 94% (23 Apr 2021 04:36) (93% - 98%)  Daily     Daily     Appearance: Alert, responsive, in no acute distress.  Musculoskeletal:       Right Upper Extremity: + ACE bandage clean, dry, intact. ACE bandage removed. Dressings with mild bloody discharge. Incision sites clean, dry, intact with no active bleeding or discharge noted. Penrose drain removed without complications. New bulky dressing applied and wrapped with ACE. + WF/WE/ROM of fingers. Compartments soft NT. 2+ radial pulse. Sensation intact to medial/ulnar/radial distribution of hand and C5-T1.         A/P: 70F s/p I&D of Right Elbow Septic Olecranon Bursitis POD#2    PLAN:   1. Pain control  2. WBAT RUE in sling for comfort, no leaning on the elbow  3. PT/OOB  4. DVT PPX  6. IV Abx as per ID   8. Dispo planning
[facility] Division of Hospital Medicine    Chief Complaint:  Patient is a 71y old  Female who presents with a chief complaint of Right Elbow pain (26 Apr 2021 07:34)      SUBJECTIVE / OVERNIGHT EVENTS:  Patient was seen and examined at bedside. Currently offers no complaints.   Patient denies chest pain, SOB, abd pain, N/V, fever, chills, dysuria or any other complaints. All remainder ROS negative.     MEDICATIONS  (STANDING):  bethanechol 10 milliGRAM(s) Oral three times a day  celecoxib 200 milliGRAM(s) Oral daily  escitalopram 20 milliGRAM(s) Oral daily  gabapentin 300 milliGRAM(s) Oral three times a day  heparin   Injectable 5000 Unit(s) SubCutaneous every 8 hours  vancomycin  IVPB 1000 milliGRAM(s) IV Intermittent every 12 hours    MEDICATIONS  (PRN):  acetaminophen   Tablet .. 650 milliGRAM(s) Oral every 4 hours PRN Mild Pain (1 - 3)  calcium carbonate    500 mG (Tums) Chewable 3 Tablet(s) Chew every 6 hours PRN Dyspepsia  senna 2 Tablet(s) Oral at bedtime PRN Constipation  traMADol 50 milliGRAM(s) Oral every 6 hours PRN Moderate Pain (4 - 6)  traMADol 100 milliGRAM(s) Oral every 6 hours PRN Severe Pain (7 - 10)        I&O's Summary      PHYSICAL EXAM:  Vital Signs Last 24 Hrs  T(C): 36.7 (26 Apr 2021 04:45), Max: 37.5 (25 Apr 2021 16:45)  T(F): 98 (26 Apr 2021 04:45), Max: 99.5 (25 Apr 2021 16:45)  HR: 71 (26 Apr 2021 04:45) (65 - 96)  BP: 109/68 (26 Apr 2021 04:45) (94/59 - 134/66)  BP(mean): 64 (25 Apr 2021 16:45) (60 - 83)  RR: 18 (26 Apr 2021 04:45) (11 - 18)  SpO2: 95% (26 Apr 2021 04:45) (95% - 100%)      Constitutional: NAD, Resting, Paraplegic  ENT: Supple, No JVD  Lungs: CTA B/L, Non-labored breathing  Cardio: RRR, S1/S2, No murmur  Abdomen: Soft, Nontender, Nondistended; Bowel sounds present  Extremities: No calf tenderness, No pitting edema, Right upper extremity wrapped with splint and now connected to a wound vac  Musculoskeletal:   No clubbing or cyanosis of digits; no joint swelling or tenderness to palpation  Psych: Calm, cooperative affect appropriate  Neuro: Awake and alert  Skin: No rashes; no palpable lesions    LABS:                        11.2   6.77  )-----------( 286      ( 26 Apr 2021 05:55 )             35.7     04-26    140  |  104  |  11.0  ----------------------------<  82  4.5   |  25.0  |  0.24<L>    Ca    8.7      26 Apr 2021 05:55    TPro  6.3<L>  /  Alb  3.1<L>  /  TBili  <0.2<L>  /  DBili  x   /  AST  23  /  ALT  16  /  AlkPhos  88  04-26              CAPILLARY BLOOD GLUCOSE            RADIOLOGY REVIEWED  
[facility] Division of Hospital Medicine    Chief Complaint:  Elbow pain     SUBJECTIVE / OVERNIGHT EVENTS:  Pt examined sitting up in bed  States that she feels well,  Drain removed this am by ortho  Patient denies chest pain, SOB, abd pain, N/V, fever, chills, dysuria or any other complaints. All remainder ROS negative.     Brief hospital course to date   70 year old female with OA, wheelchair bound due to kyphosis with multiple back surgeries and sacral ulcer (s/p plastic surgery with good healing) urinary incontinence admitted for I&D of Rt olecranon bursitis with sinus tracts. s/p OR 4/21 with drain placement which was removed 4/22. Intraop cultures are growing Staph aureus (pending oxacillin sens). ID is following the pt for IV antibiotics       MEDICATIONS  (STANDING):  aspirin 325 milliGRAM(s) Oral daily  bethanechol 10 milliGRAM(s) Oral three times a day  ceFAZolin   IVPB 2000 milliGRAM(s) IV Intermittent every 8 hours  celecoxib 200 milliGRAM(s) Oral daily  escitalopram 20 milliGRAM(s) Oral daily  gabapentin 300 milliGRAM(s) Oral three times a day  heparin   Injectable 5000 Unit(s) SubCutaneous every 8 hours  vancomycin  IVPB 1000 milliGRAM(s) IV Intermittent every 12 hours    MEDICATIONS  (PRN):  acetaminophen   Tablet .. 650 milliGRAM(s) Oral every 4 hours PRN Mild Pain (1 - 3)  calcium carbonate    500 mG (Tums) Chewable 3 Tablet(s) Chew every 6 hours PRN Dyspepsia  senna 2 Tablet(s) Oral at bedtime PRN Constipation  traMADol 50 milliGRAM(s) Oral every 6 hours PRN Moderate Pain (4 - 6)  traMADol 100 milliGRAM(s) Oral every 6 hours PRN Severe Pain (7 - 10)        PHYSICAL EXAM:  Vital Signs Last 24 Hrs  T(C): 36.8 (23 Apr 2021 04:36), Max: 36.9 (22 Apr 2021 11:02)  T(F): 98.2 (23 Apr 2021 04:36), Max: 98.4 (22 Apr 2021 11:02)  HR: 86 (23 Apr 2021 04:36) (77 - 87)  BP: 108/52 (23 Apr 2021 04:36) (94/63 - 108/52)  BP(mean): --  RR: 18 (23 Apr 2021 04:36) (16 - 20)  SpO2: 94% (23 Apr 2021 04:36) (93% - 98%)    Constitutional: No acute distress, alert and oriented   HEENT: AT/NC, EOMI, PERRLA, Normal conjunctiva, no pharyngeal erythema, moist oral mucosa  Respiratory: CTA BL, equal breath sounds, no crackles or wheezing  Cardiovascular: RRR, no edema  Gastrointestinal: soft, Non-tender, Non-distended + Bowel sounds, no rebound or guarding  Musculoskeletal: right elbow wrapped in ace, LE bent at knees, b/l ch arthritic changes fingers   Neurological: CN 2-12 grossly intact, bed/wheelchair bound   Skin: warm, dry and intact  Psychiatric: normal mood, pleasant     LABS:                                   10.4   10.95 )-----------( 303      ( 23 Apr 2021 06:27 )             33.7   04-23    139  |  102  |  23.0<H>  ----------------------------<  97  4.3   |  29.0  |  0.39<L>    Ca    8.1<L>      23 Apr 2021 06:27  Mg     2.2     04-22    TPro  6.8  /  Alb  3.3  /  TBili  <0.2<L>  /  DBili  x   /  AST  18  /  ALT  13  /  AlkPhos  90  04-22          RADIOLOGY & ADDITIONAL TESTS:  4/21/21 XR Elbow (Rt)  No acute radiographic findings and no change, if there is continued clinical concern of abscess follow-up MRI can be ordered    4/22/21 CXR  IMPRESSION: No acute cardiopulmonary disease process.    4/21/22 Olecranon bursa swab (intraop): Staphylococcus aureus (sens pending)                                     
[facility]  INFECTIOUS DISEASES AND INTERNAL MEDICINE at [place]  =======================================================  Kashif Ojeda MD  Diplomates American Board of Internal Medicine and Infectious Diseases  Tel  [phone]  Fax [phone]  =======================================================    [id number]  PATRIA MCLAIN   follow up: RIGHT elbow infection    s/p washout of elbow joint  cultures sent; multiple sets with staph aureus.   Awaiting identification/ susceptibility    Social Hx:  =======  no toxic habits currently    FAMILY HISTORY:  no significant family history of immunosuppressive disorders in mother or father   =======================================================    REVIEW OF SYSTEMS:  CONSTITUTIONAL:  No Fever or chills  HEENT:  No diplopia or blurred vision.  No earache, sore throat or runny nose.  CARDIOVASCULAR:  No pressure, squeezing, strangling, tightness, heaviness or aching about the chest, neck, axilla or epigastrium.  RESPIRATORY:  No cough, shortness of breath  GASTROINTESTINAL:  No nausea, vomiting or diarrhea.  GENITOURINARY:  No dysuria, frequency or urgency. No Blood in urine  MUSCULOSKELETAL:  no joint aches, no muscle pain  SKIN:  No change in skin, hair or nails.  NEUROLOGIC:  No Headaches, seizures or weakness.  PSYCHIATRIC:  No disorder of thought or mood.  ENDOCRINE:  No heat or cold intolerance  HEMATOLOGICAL:  No easy bruising or bleeding.    =======================================================  Allergies  No Known Allergies     ======================================================  Physical Exam:  ============     General:  No acute distress.  Eye: Pupils are equal, round and reactive to light, Extraocular movements are intact, Normal conjunctiva.  HENT: Normocephalic, Oral mucosa is moist, No pharyngeal erythema, No sinus tenderness.  Neck: Supple, No lymphadenopathy.  Respiratory: Lungs are clear to auscultation, Respirations are non-labored.  Cardiovascular: Normal rate, Regular rhythm,   Gastrointestinal: Soft, Non-tender, Non-distended, Normal bowel sounds.  Genitourinary: No costovertebral angle tenderness.  Lymphatics: No lymphadenopathy neck,   Musculoskeletal: Normal range of motion, Normal strength.  RIGHT elbow with dressing in place  deformity to the RIGHT anterior leg  Integumentary: No rash.  Neurologic: Alert, Oriented, No focal deficits, Cranial Nerves II-XII are grossly intact.  Psychiatric: Appropriate mood & affect.    =======================================================  Vitals:  ============  T(F): 98.2 (23 Apr 2021 11:06), Max: 98.4 (22 Apr 2021 23:13)  HR: 78 (23 Apr 2021 11:06)  BP: 102/61 (23 Apr 2021 11:06)  RR: 18 (23 Apr 2021 11:06)  SpO2: 97% (23 Apr 2021 11:06) (94% - 97%)  temp max in last 48H T(F): , Max: 98.4 (04-22-21 @ 11:02)    =======================================================  Current Antibiotics:  vancomycin  IVPB 1000 milliGRAM(s) IV Intermittent every 12 hours    Other medications:  aspirin 325 milliGRAM(s) Oral daily  bethanechol 10 milliGRAM(s) Oral three times a day  celecoxib 200 milliGRAM(s) Oral daily  escitalopram 20 milliGRAM(s) Oral daily  gabapentin 300 milliGRAM(s) Oral three times a day  heparin   Injectable 5000 Unit(s) SubCutaneous every 8 hours      =======================================================  Labs:                        10.4   10.95 )-----------( 303      ( 23 Apr 2021 06:27 )             33.7     04-23    139  |  102  |  23.0<H>  ----------------------------<  97  4.3   |  29.0  |  0.39<L>    Ca    8.1<L>      23 Apr 2021 06:27  Mg     2.2     04-22    TPro  6.8  /  Alb  3.3  /  TBili  <0.2<L>  /  DBili  x   /  AST  18  /  ALT  13  /  AlkPhos  90  04-22      Culture - Surgical Swab (collected 04-22-21 @ 00:41)  Source: .Surgical Swab right elbow ole cranon bursa #3    Culture - Surgical Swab (collected 04-22-21 @ 00:41)  Source: .Surgical Swab right elbow ole cranon bursa #2    Culture - Surgical Swab (collected 04-22-21 @ 00:41)  Source: .Surgical Swab right elbow ole cranon bursa #1      Creatinine, Serum: 0.39 mg/dL (04-23-21 @ 06:27)  Creatinine, Serum: 0.32 mg/dL (04-22-21 @ 07:23)  Creatinine, Serum: 0.30 mg/dL (04-22-21 @ 07:23)  Creatinine, Serum: 0.32 mg/dL (04-21-21 @ 12:27)    C-Reactive Protein, Serum: 15 mg/L (04-21-21 @ 12:27)  C-Reactive Protein, Serum: 85 mg/L (04-14-21 @ 22:50)    WBC Count: 10.95 K/uL (04-23-21 @ 06:27)  WBC Count: 7.69 K/uL (04-22-21 @ 07:23)  WBC Count: 6.50 K/uL (04-21-21 @ 12:27)    COVID-19 PCR: NotDetec (04-21-21 @ 13:20)    Alkaline Phosphatase, Serum: 90 U/L (04-22-21 @ 07:23)  Alkaline Phosphatase, Serum: 106 U/L (04-21-21 @ 12:27)  Alanine Aminotransferase (ALT/SGPT): 13 U/L (04-22-21 @ 07:23)  Alanine Aminotransferase (ALT/SGPT): 17 U/L (04-21-21 @ 12:27)  Aspartate Aminotransferase (AST/SGOT): 18 U/L (04-22-21 @ 07:23)  Aspartate Aminotransferase (AST/SGOT): 19 U/L (04-21-21 @ 12:27)  Bilirubin Total, Serum: <0.2 mg/dL (04-22-21 @ 07:23)  Bilirubin Total, Serum: 0.2 mg/dL (04-21-21 @ 12:27)      < from: Xray Elbow AP + Lateral + Oblique, Right (04.21.21 @ 11:46) >     EXAM:  ELBOW-RIGHT                          PROCEDURE DATE:  04/21/2021          INTERPRETATION:  Clinical history: 70-year-old female, right elbow infection.    Four views of the right elbow are compared to 4/14/2021 and demonstrate mild degenerative change with no fracture, dislocation, radiopaque foreign body, gross cortical destruction or soft tissue emphysema.    A punctate radiopacity is projected posterior to the proximal ulnar shaft on the lateral view.    IMPRESSION::  No acute radiographic findings and no change, if there is continued clinical concern of abscess follow-up MRI can be ordered      MICHAEL DELGADILLO DO; Attending Radiologist  This document has been electronically signed. Apr 21 2021  1:06PM    < end of copied text >  
Patient is a 70y female s/p washout of right elbow bursa on 4/21, POD #3. Patient seen and examined at bedside, resting comfortably. Pain is being well controlled with current prescribed pain medications. Patient participating in PT. Patient denies fevers, chills, SOB, CP, numbness, tingling, nausea, vomiting, lightheadedness. Patient denies acute motor or sensory changes.                                          10.4   10.95 )-----------( 303      ( 23 Apr 2021 06:27 )             33.7     04-23    139  |  102  |  23.0<H>  ----------------------------<  97  4.3   |  29.0  |  0.39<L>    Ca    8.1<L>      23 Apr 2021 06:27          PHYSICAL EXAM:    Vital Signs Last 24 Hrs  T(C): 36.7 (24 Apr 2021 07:41), Max: 36.8 (23 Apr 2021 11:06)  T(F): 98.1 (24 Apr 2021 07:41), Max: 98.3 (23 Apr 2021 20:47)  HR: 85 (24 Apr 2021 07:41) (74 - 85)  BP: 108/65 (24 Apr 2021 07:41) (102/61 - 122/81)  BP(mean): --  RR: 20 (24 Apr 2021 07:41) (18 - 20)  SpO2: 94% (24 Apr 2021 07:41) (93% - 97%)     Appearance: Alert, responsive, in no acute distress.  Musculoskeletal:       Right Upper Extremity: + ACE bandage clean, dry, intact. + WF/WE/ROM of fingers. Compartments soft NT. 2+ radial pulse. Sensation intact        A/P: 70F s/p I&D of Right Elbow Septic Olecranon Bursitis POD#3    PLAN:   1. Pain control  2. WBAT RUE in sling for comfort, no leaning on the elbow  3. PT/OOB  4. DVT PPX  6. IV Abx as per ID   8. Dispo planning       Culture - Surgical Swab (collected 22 Apr 2021 00:41)  Source: .Surgical Swab right elbow ole cranon bursa #3  Preliminary Report (23 Apr 2021 17:27):    Few Methicillin resistant Staphylococcus aureus      
Patient seen and examined at bedside. Pain well controlled, resting comfortably in bed. Denies fevers, chills, sweats, numbness, tingling, or pain elsewhere.    ICU Vital Signs Last 24 Hrs  T(C): 36.7 (26 Apr 2021 04:45), Max: 37.5 (25 Apr 2021 16:45)  T(F): 98 (26 Apr 2021 04:45), Max: 99.5 (25 Apr 2021 16:45)  HR: 71 (26 Apr 2021 04:45) (65 - 96)  BP: 109/68 (26 Apr 2021 04:45) (94/59 - 134/66)  BP(mean): 64 (25 Apr 2021 16:45) (60 - 83)  ABP: --  ABP(mean): --  RR: 18 (26 Apr 2021 04:45) (11 - 18)  SpO2: 95% (26 Apr 2021 04:45) (95% - 100%)        Gen: AAOx3, NAD  RUE: splint c/di, Wound vac in place, AIN/PIN/R/U/M +, SILT C5-T1, cap refill brisk, compartment soft and compressible        A/P: 71F s/p repeat I&D for Right Elbow Septic Olecranon Bursitis with wound dehiscence POD#1    1. Pain control  2. NWB RUE in splint  3. Monitor wound vac output  4. PT/OOB  5. FU labs  6. Cx + for MRSA  7. ID recommendations appreciated, continue IV Abx
[facility]  INFECTIOUS DISEASES AND INTERNAL MEDICINE at [place]  =======================================================  Kashif Ojeda MD  Diplomates American Board of Internal Medicine and Infectious Diseases  Tel  [phone]  Fax [phone]  =======================================================    [id number]  PATRIA MCLAIN   follow up: RIGHT elbow infection    s/p washout of elbow joint  cultures sent; multiple sets with CA- MRSA    went back to OR for washout and wound vac placement on 4/25/21    Social Hx:  =======  no toxic habits currently    FAMILY HISTORY:  no significant family history of immunosuppressive disorders in mother or father   =======================================================    REVIEW OF SYSTEMS:  CONSTITUTIONAL:  No Fever or chills  HEENT:  No diplopia or blurred vision.  No earache, sore throat or runny nose.  CARDIOVASCULAR:  No pressure, squeezing, strangling, tightness, heaviness or aching about the chest, neck, axilla or epigastrium.  RESPIRATORY:  No cough, shortness of breath  GASTROINTESTINAL:  No nausea, vomiting or diarrhea.  GENITOURINARY:  No dysuria, frequency or urgency. No Blood in urine  MUSCULOSKELETAL:  no joint aches, no muscle pain  SKIN:  No change in skin, hair or nails.  NEUROLOGIC:  No Headaches, seizures or weakness.  PSYCHIATRIC:  No disorder of thought or mood.  ENDOCRINE:  No heat or cold intolerance  HEMATOLOGICAL:  No easy bruising or bleeding.    =======================================================  Allergies  No Known Allergies     ======================================================  Physical Exam:  ============     General:  No acute distress.  Eye: Pupils are equal, round and reactive to light, Extraocular movements are intact, Normal conjunctiva.  HENT: Normocephalic, Oral mucosa is moist, No pharyngeal erythema, No sinus tenderness.  Neck: Supple, No lymphadenopathy.  Respiratory: Lungs are clear to auscultation, Respirations are non-labored.  Cardiovascular: Normal rate, Regular rhythm,   Gastrointestinal: Soft, Non-tender, Non-distended, Normal bowel sounds.  Genitourinary: No costovertebral angle tenderness.  Lymphatics: No lymphadenopathy neck,   Musculoskeletal: Normal range of motion, Normal strength.  RIGHT elbow  with dressing and wound vac in place  deformity to the RIGHT anterior leg  Integumentary: No rash.  Neurologic: Alert, Oriented, No focal deficits, Cranial Nerves II-XII are grossly intact.  Psychiatric: Appropriate mood & affect.    =======================================================  Vitals:  ============  T(F): 98.3 (26 Apr 2021 11:14), Max: 99.5 (25 Apr 2021 16:45)  HR: 88 (26 Apr 2021 11:14)  BP: 107/66 (26 Apr 2021 11:14)  RR: 18 (26 Apr 2021 11:14)  SpO2: 95% (26 Apr 2021 11:14) (95% - 100%)  temp max in last 48H T(F): , Max: 99.5 (04-25-21 @ 16:45)    =======================================================  Current Antibiotics:  doxycycline hyclate Capsule 100 milliGRAM(s) Oral every 12 hours    Other medications:  bethanechol 10 milliGRAM(s) Oral three times a day  celecoxib 200 milliGRAM(s) Oral daily  escitalopram 20 milliGRAM(s) Oral daily  gabapentin 300 milliGRAM(s) Oral three times a day  heparin   Injectable 5000 Unit(s) SubCutaneous every 8 hours      =======================================================  Labs:                        11.2   6.77  )-----------( 286      ( 26 Apr 2021 05:55 )             35.7     04-26    140  |  104  |  11.0  ----------------------------<  82  4.5   |  25.0  |  0.24<L>    Ca    8.7      26 Apr 2021 05:55    TPro  6.3<L>  /  Alb  3.1<L>  /  TBili  <0.2<L>  /  DBili  x   /  AST  23  /  ALT  16  /  AlkPhos  88  04-26      Culture - Surgical Swab (collected 04-22-21 @ 00:41)  Source: .Surgical Swab right elbow ole cranon bursa #3  Organism: Methicillin resistant Staphylococcus aureus (04-23-21 @ 17:27)  Organism: Methicillin resistant Staphylococcus aureus (04-23-21 @ 17:27)    Sensitivities:      -  Ampicillin/Sulbactam: R 16/8      -  Cefazolin: R 16      -  Clindamycin: S <=0.25      -  Daptomycin: S 0.5      -  Erythromycin: R >4      -  Gentamicin: S <=1 Should not be used as monotherapy      -  Linezolid: S 2      -  Oxacillin: R >2      -  Penicillin: R >8      -  RIF- Rifampin: S <=1 Should not be used as monotherapy      -  Tetra/Doxy: S <=1      -  Trimethoprim/Sulfamethoxazole: S <=0.5/9.5      -  Vancomycin: S 1      Method Type: ALIZA    Culture - Surgical Swab (collected 04-22-21 @ 00:41)  Source: .Surgical Swab right elbow ole cranon bursa #2    Culture - Surgical Swab (collected 04-22-21 @ 00:41)  Source: .Surgical Swab right elbow ole cranon bursa #1  Organism: Methicillin resistant Staphylococcus aureus (04-23-21 @ 17:34)  Organism: Methicillin resistant Staphylococcus aureus (04-23-21 @ 17:34)    Sensitivities:      -  Ampicillin/Sulbactam: R 16/8      -  Cefazolin: R 16      -  Clindamycin: S <=0.25      -  Daptomycin: S 1      -  Erythromycin: R >4      -  Gentamicin: S <=1 Should not be used as monotherapy      -  Linezolid: S 2      -  Oxacillin: R >2      -  Penicillin: R >8      -  RIF- Rifampin: S <=1 Should not be used as monotherapy      -  Tetra/Doxy: S <=1      -  Trimethoprim/Sulfamethoxazole: S <=0.5/9.5      -  Vancomycin: S 2      Method Type: ALIZA    Culture - Blood (collected 04-21-21 @ 13:21)  Source: .Blood Blood-Peripheral    Culture - Blood (collected 04-21-21 @ 13:19)  Source: .Blood Blood-Peripheral        C-Reactive Protein, Serum: 15 mg/L (04-21-21 @ 12:27)  C-Reactive Protein, Serum: 85 mg/L (04-14-21 @ 22:50)    Sedimentation Rate, Erythrocyte: 45 mm/hr (04-21-21 @ 12:27)        COVID-19 PCR: NotDetec (04-21-21 @ 13:20)  
[facility] Division of Hospital Medicine    Chief Complaint:  Patient is a 71y old  Female who presents with a chief complaint of Right Elbow pain (25 Apr 2021 08:17)      SUBJECTIVE / OVERNIGHT EVENTS:  Patient was seen and examined at bedside. States that pain is improved in right elbow.   Patient denies chest pain, SOB, abd pain, N/V, fever, chills, dysuria or any other complaints. All remainder ROS negative.     MEDICATIONS  (STANDING):  aspirin 325 milliGRAM(s) Oral daily  bethanechol 10 milliGRAM(s) Oral three times a day  celecoxib 200 milliGRAM(s) Oral daily  escitalopram 20 milliGRAM(s) Oral daily  gabapentin 300 milliGRAM(s) Oral three times a day  heparin   Injectable 5000 Unit(s) SubCutaneous every 8 hours  vancomycin  IVPB 1000 milliGRAM(s) IV Intermittent every 12 hours    MEDICATIONS  (PRN):  acetaminophen   Tablet .. 650 milliGRAM(s) Oral every 4 hours PRN Mild Pain (1 - 3)  calcium carbonate    500 mG (Tums) Chewable 3 Tablet(s) Chew every 6 hours PRN Dyspepsia  senna 2 Tablet(s) Oral at bedtime PRN Constipation  traMADol 50 milliGRAM(s) Oral every 6 hours PRN Moderate Pain (4 - 6)  traMADol 100 milliGRAM(s) Oral every 6 hours PRN Severe Pain (7 - 10)        I&O's Summary      PHYSICAL EXAM:  Vital Signs Last 24 Hrs  T(C): 36.8 (25 Apr 2021 04:51), Max: 36.9 (24 Apr 2021 19:40)  T(F): 98.2 (25 Apr 2021 04:51), Max: 98.4 (24 Apr 2021 19:40)  HR: 82 (25 Apr 2021 04:51) (73 - 82)  BP: 109/66 (25 Apr 2021 04:51) (107/67 - 128/74)  BP(mean): --  RR: 18 (25 Apr 2021 04:51) (18 - 19)  SpO2: 93% (25 Apr 2021 04:51) (93% - 97%)      Constitutional: NAD, Resting  ENT: Supple, No JVD  Lungs: CTA B/L, Non-labored breathing  Cardio: RRR, S1/S2, No murmur  Abdomen: Soft, Nontender, Nondistended; Bowel sounds present  Extremities: No calf tenderness, No pitting edema, Right elbow is wrapped  Musculoskeletal:   No clubbing or cyanosis of digits; no joint swelling or tenderness to palpation  Psych: Calm, cooperative affect appropriate  Neuro: Awake and alert  Skin: No rashes; no palpable lesions    LABS:                        11.1   8.91  )-----------( 294      ( 24 Apr 2021 08:10 )             36.3     04-24    142  |  102  |  22.0<H>  ----------------------------<  78  4.8   |  28.0  |  0.36<L>    Ca    8.3<L>      24 Apr 2021 08:10                CAPILLARY BLOOD GLUCOSE            RADIOLOGY REVIEWED  
Orthopedic PA Postop Note  Patient S/P RIGHT ELBOW BURSA WASHOUT  Patient in bed comfortable   RIGHT UPPER EXTREMITY  Dressing C/D/I - ACE wrap without staining, splint intact in extension  PREVENA VAC functioning  cap refill < 2 sec  Palpable compartment soft and compressible  +flexion/extension/abduction/adduction off all digits, wrist flexion/extension limited by splint   Sensation intact to light touch    Vital Signs Last 24 Hrs  T(C): 36.8 (25 Apr 2021 18:06), Max: 37.5 (25 Apr 2021 16:45)  T(F): 98.3 (25 Apr 2021 18:06), Max: 99.5 (25 Apr 2021 16:45)  HR: 76 (25 Apr 2021 18:06) (65 - 85)  BP: 124/84 (25 Apr 2021 18:06) (94/59 - 134/66)  BP(mean): 64 (25 Apr 2021 16:45) (60 - 83)  RR: 16 (25 Apr 2021 18:06) (11 - 18)  SpO2: 97% (25 Apr 2021 18:06) (93% - 100%)      A/P:  S/P RIGHT ELBOW BURSA WASHOUT  1. DVTP - AS PER MEDICINE  2. Physical Therapy   3. Pain Control as clinically indicated 
Patient seen and examined at bedside. Pain controlled, resting comfortably in bed. No events overnight. Denies numbness, tingling, weakness, pain elsewhere at this time.    ICU Vital Signs Last 24 Hrs  T(C): 36.6 (22 Apr 2021 04:25), Max: 36.9 (21 Apr 2021 10:58)  T(F): 97.8 (22 Apr 2021 04:25), Max: 98.4 (21 Apr 2021 10:58)  HR: 74 (22 Apr 2021 04:25) (60 - 85)  BP: 110/68 (22 Apr 2021 04:25) (106/58 - 145/82)  BP(mean): --  ABP: --  ABP(mean): --  RR: 18 (22 Apr 2021 04:25) (14 - 20)  SpO2: 91% (22 Apr 2021 04:25) (91% - 100%)      Gen: AAOx3, NAD  RUE: dressing c/d/i, dressing just recently changed this AM, mild swelling about forearm, AIN/PIN/R/U/M +, SILT C5-T1, radial pulse 2+, compartments soft            A/P: 70F s/p I&D of Right Elbow Septic Olecranon Bursitis POD#1    1. Pain control  2. WBAT RUE in sling for comfort, no leaning on the elbow  3. PT/OOB  4. DVT PPX  5. FU labs  6. IV Abx  7. FU ID recommendations  8. Dispo planning  
Patient seen and examined at bedside. Pain well controlled, resting comfortably in bed. Denies fevers, chills, sweats, numbness, tingling, or pain elsewhere.    ICU Vital Signs Last 24 Hrs  T(C): 36.8 (25 Apr 2021 04:51), Max: 36.9 (24 Apr 2021 19:40)  T(F): 98.2 (25 Apr 2021 04:51), Max: 98.4 (24 Apr 2021 19:40)  HR: 82 (25 Apr 2021 04:51) (73 - 82)  BP: 109/66 (25 Apr 2021 04:51) (107/67 - 128/74)  BP(mean): --  ABP: --  ABP(mean): --  RR: 18 (25 Apr 2021 04:51) (18 - 19)  SpO2: 93% (25 Apr 2021 04:51) (93% - 97%)      Gen: AAOx3, NAD  RUE: dressing changed. area of wound dehiscence appears slightly larger than yesterday. no purulence expressed. erythema continues to improve. AIN/PIN/R/U/M +, SILT C5-T1, radial pulse 2+, compartments soft and compressible        A/P: 71F s/p Right Elbow I&D for Septic Elbow Bursitis with draining sinus now with surgical wound dehiscence    1. Pain control  2. WBAT, no leaning on elbow  3. PT/OOB  4. NPO/IVF  5. Hold anticoagulation  6. Cx + for MRSA  7. ID recommendations appreciated, continue IV Abx  8. Plan for OR today for repeat I&D, wound vac, extension splinting
[facility]  INFECTIOUS DISEASES AND INTERNAL MEDICINE at [place]  =======================================================  Kashif Ojeda MD  Diplomates American Board of Internal Medicine and Infectious Diseases  Tel  [phone]  Fax [phone]  =======================================================    [id number]  PATRIA MCLAIN   follow up: RIGHT elbow infection    s/p washout of elbow joint  cultures sent      Social Hx:  =======  no toxic habits currently    FAMILY HISTORY:  no significant family history of immunosuppressive disorders in mother or father   =======================================================    REVIEW OF SYSTEMS:  CONSTITUTIONAL:  No Fever or chills  HEENT:  No diplopia or blurred vision.  No earache, sore throat or runny nose.  CARDIOVASCULAR:  No pressure, squeezing, strangling, tightness, heaviness or aching about the chest, neck, axilla or epigastrium.  RESPIRATORY:  No cough, shortness of breath  GASTROINTESTINAL:  No nausea, vomiting or diarrhea.  GENITOURINARY:  No dysuria, frequency or urgency. No Blood in urine  MUSCULOSKELETAL:  no joint aches, no muscle pain  SKIN:  No change in skin, hair or nails.  NEUROLOGIC:  No Headaches, seizures or weakness.  PSYCHIATRIC:  No disorder of thought or mood.  ENDOCRINE:  No heat or cold intolerance  HEMATOLOGICAL:  No easy bruising or bleeding.    =======================================================  Allergies  No Known Allergies         ======================================================  Physical Exam:  ============     General:  No acute distress.  Eye: Pupils are equal, round and reactive to light, Extraocular movements are intact, Normal conjunctiva.  HENT: Normocephalic, Oral mucosa is moist, No pharyngeal erythema, No sinus tenderness.  Neck: Supple, No lymphadenopathy.  Respiratory: Lungs are clear to auscultation, Respirations are non-labored.  Cardiovascular: Normal rate, Regular rhythm,   Gastrointestinal: Soft, Non-tender, Non-distended, Normal bowel sounds.  Genitourinary: No costovertebral angle tenderness.  Lymphatics: No lymphadenopathy neck,   Musculoskeletal: Normal range of motion, Normal strength.  RIGHT elbow with dressing in place  deformity to the RIGHT anterior leg  Integumentary: No rash.  Neurologic: Alert, Oriented, No focal deficits, Cranial Nerves II-XII are grossly intact.  Psychiatric: Appropriate mood & affect.    =======================================================  Vitals:  ============  T(F): 97.8 (22 Apr 2021 04:25), Max: 98.4 (21 Apr 2021 10:58)  HR: 74 (22 Apr 2021 04:25)  BP: 110/68 (22 Apr 2021 04:25)  RR: 18 (22 Apr 2021 04:25)  SpO2: 91% (22 Apr 2021 04:25) (91% - 100%)  temp max in last 48H T(F): , Max: 98.4 (04-21-21 @ 10:58)    =======================================================  Current Antibiotics:  ceFAZolin   IVPB 2000 milliGRAM(s) IV Intermittent every 8 hours  vancomycin  IVPB 1000 milliGRAM(s) IV Intermittent every 12 hours    Other medications:  aspirin 325 milliGRAM(s) Oral daily  bethanechol 10 milliGRAM(s) Oral three times a day  celecoxib 200 milliGRAM(s) Oral daily  escitalopram 20 milliGRAM(s) Oral daily  gabapentin 300 milliGRAM(s) Oral three times a day      =======================================================  Labs:                        11.9   7.69  )-----------( 337      ( 22 Apr 2021 07:23 )             38.4     04-22    139  |  103  |  10.0  ----------------------------<  124<H>  5.0   |  27.0  |  0.32<L>    Ca    8.8      22 Apr 2021 07:23  Mg     2.2     04-22    TPro  6.8  /  Alb  3.3  /  TBili  <0.2<L>  /  DBili  x   /  AST  18  /  ALT  13  /  AlkPhos  90  04-22        C-Reactive Protein, Serum: 15 mg/L (04-21-21 @ 12:27)  C-Reactive Protein, Serum: 85 mg/L (04-14-21 @ 22:50)    Sedimentation Rate, Erythrocyte: 45 mm/hr (04-21-21 @ 12:27)       COVID-19 PCR: NotDetec (04-21-21 @ 13:20)      < from: Xray Elbow AP + Lateral + Oblique, Right (04.21.21 @ 11:46) >     EXAM:  ELBOW-RIGHT                          PROCEDURE DATE:  04/21/2021          INTERPRETATION:  Clinical history: 70-year-old female, right elbow infection.    Four views of the right elbow are compared to 4/14/2021 and demonstrate mild degenerative change with no fracture, dislocation, radiopaque foreign body, gross cortical destruction or soft tissue emphysema.    A punctate radiopacity is projected posterior to the proximal ulnar shaft on the lateral view.    IMPRESSION::  No acute radiographic findings and no change, if there is continued clinical concern of abscess follow-up MRI can be ordered           MICHAEL DELGADILLO DO; Attending Radiologist  This document has been electronically signed. Apr 21 2021  1:06PM    < end of copied text >  
Ortho Post Op Check    Name: PATRIA MCLAIN    MR #: [id number]    Procedure: right elbow I&D  Surgeon: Amado    Pt comfortable without complaints, pain controlled  Denies CP, SOB, N/V, numbness/tingling     General Exam:  Vital Signs Last 24 Hrs  T(C): 36.4 (04-21-21 @ 18:40), Max: 36.8 (04-21-21 @ 16:23)  T(F): 97.6 (04-21-21 @ 18:40), Max: 98.3 (04-21-21 @ 16:23)  HR: 60 (04-21-21 @ 19:00) (60 - 84)  BP: 106/58 (04-21-21 @ 19:00) (106/58 - 137/85)  BP(mean): --  RR: 15 (04-21-21 @ 19:00) (15 - 20)  SpO2: 100% (04-21-21 @ 19:00) (97% - 100%)    General: Pt Alert and oriented, NAD, controlled pain.  Dressings ACE C/D/I. No bleeding.  Pulses: RP2+, BCR  Sensation: Grossly intact to light touch without deficit.  Motor: +wrist flexion/extension, +ROM digits intact    A/P: 70yFemale POD#0 s/p right elbow septic bursitis I&D  - Stable  - Pain Control  - DVT ppx: asa  - Post op abx: ancef/vanco  - Weight bearing status: wbat, sling for comfort
Patient is a 70y female s/p washout of right elbow bursa on 4/21, POD #3. Patient seen and examined at bedside, resting comfortably. Pain is being well controlled with current prescribed pain medications. Patient participating in PT. Patient denies fevers, chills, SOB, CP, numbness, tingling, nausea, vomiting, lightheadedness. Patient denies acute motor or sensory changes.                                          10.4   10.95 )-----------( 303      ( 23 Apr 2021 06:27 )             33.7     04-23    139  |  102  |  23.0<H>  ----------------------------<  97  4.3   |  29.0  |  0.39<L>    Ca    8.1<L>      23 Apr 2021 06:27          PHYSICAL EXAM:    Vital Signs Last 24 Hrs  T(C): 36.7 (24 Apr 2021 07:41), Max: 36.8 (23 Apr 2021 11:06)  T(F): 98.1 (24 Apr 2021 07:41), Max: 98.3 (23 Apr 2021 20:47)  HR: 85 (24 Apr 2021 07:41) (74 - 85)  BP: 108/65 (24 Apr 2021 07:41) (102/61 - 122/81)  BP(mean): --  RR: 20 (24 Apr 2021 07:41) (18 - 20)  SpO2: 94% (24 Apr 2021 07:41) (93% - 97%)     Appearance: Alert, responsive, in no acute distress.  Musculoskeletal:       Right Upper Extremity: Dressing changed, wound examined. Small area of dehiscence with suture pull-out in the central aspect of the main incision. this corresponds with the area of patient's draining sinus. remaining area of incisions is intact and erythema/swelling continue to improve. otherwise NVI        A/P: 70F s/p I&D of Right Elbow Septic Olecranon Bursitis POD#3    PLAN:   1. Pain control  2. WBAT RUE in sling for comfort, no leaning on the elbow  3. PT/OOB  4. DVT PPX  6. IV Abx as per ID   7. Wound care consult for daily wet-to-dry dressing changes  9. Dispo planning       Culture - Surgical Swab (collected 22 Apr 2021 00:41)  Source: .Surgical Swab right elbow ole cranon bursa #3  Preliminary Report (23 Apr 2021 17:27):    Few Methicillin resistant Staphylococcus aureus      
· Subjective and Objective:   Patient is a 70y female s/p washout of right elbow bursa on 4/21, POD #4.  Pain is being well controlled.. Patient participating in PT. Patient denies fevers, chills, SOB, CP, numbness, tingling, nausea, vomiting, lightheadedness. Patient denies acute motor or sensory changes.                                                     11.1   8.91  )-----------( 294      ( 24 Apr 2021 08:10 )             36.3   04-24    142  |  102  |  22.0<H>  ----------------------------<  78  4.8   |  28.0  |  0.36<L>    Ca    8.3<L>      24 Apr 2021 08:10          cultures prelim +MRSA    PHYSICAL EXAM:    Vital Signs Last 24 Hrs  T(C): 36.8 (25 Apr 2021 04:51), Max: 36.9 (24 Apr 2021 19:40)  T(F): 98.2 (25 Apr 2021 04:51), Max: 98.4 (24 Apr 2021 19:40)  HR: 82 (25 Apr 2021 04:51) (73 - 82)  BP: 109/66 (25 Apr 2021 04:51) (107/67 - 128/74)  BP(mean): --  RR: 18 (25 Apr 2021 04:51) (18 - 19)  SpO2: 93% (25 Apr 2021 04:51) (93% - 97%)    Appearance: Alert, responsive, in no acute distress.  Musculoskeletal:       Right Upper Extremity: Dressing removed. Small area of dehiscence with suture pull-out in the central aspect of the main incision.  erythema to proximal aspect improving.  good rom right elbow  finger movement intact, sensation intact, cap refill brisk        A/P: 70F s/p I&D of Right Elbow Septic Olecranon Bursitis POD#4    PLAN:   1. Pain control  2. WBAT RUE in sling for comfort, no leaning on the elbow  3. PT/OOB  4. DVT PPX  6. PICC/ IV Abx as per ID   7. Daily dressing changes  9. Dispo planning 
[facility] Division of Hospital Medicine    Chief Complaint:  Elbow pain     SUBJECTIVE / OVERNIGHT EVENTS:  Pt admitted for I& D and IV abx of right elbow  Currently comfortable, no complaints   Patient denies chest pain, SOB, abd pain, N/V, fever, chills, dysuria or any other complaints. All remainder ROS negative.     MEDICATIONS  (STANDING):  aspirin 325 milliGRAM(s) Oral daily  bethanechol 10 milliGRAM(s) Oral three times a day  ceFAZolin   IVPB 2000 milliGRAM(s) IV Intermittent every 8 hours  celecoxib 200 milliGRAM(s) Oral daily  escitalopram 20 milliGRAM(s) Oral daily  gabapentin 300 milliGRAM(s) Oral three times a day  vancomycin  IVPB 1000 milliGRAM(s) IV Intermittent every 12 hours    MEDICATIONS  (PRN):  acetaminophen   Tablet .. 650 milliGRAM(s) Oral every 4 hours PRN Mild Pain (1 - 3)  calcium carbonate    500 mG (Tums) Chewable 3 Tablet(s) Chew every 6 hours PRN Dyspepsia  senna 2 Tablet(s) Oral at bedtime PRN Constipation  traMADol 50 milliGRAM(s) Oral every 6 hours PRN Moderate Pain (4 - 6)  traMADol 100 milliGRAM(s) Oral every 6 hours PRN Severe Pain (7 - 10)        I&O's Summary    21 Apr 2021 07:01  -  22 Apr 2021 07:00  --------------------------------------------------------  IN: 0 mL / OUT: 300 mL / NET: -300 mL        PHYSICAL EXAM:  Vital Signs Last 24 Hrs  T(C): 36.6 (22 Apr 2021 16:16), Max: 36.9 (22 Apr 2021 11:02)  T(F): 97.9 (22 Apr 2021 16:16), Max: 98.4 (22 Apr 2021 11:02)  HR: 77 (22 Apr 2021 16:16) (60 - 87)  BP: 95/59 (22 Apr 2021 16:16) (94/63 - 137/85)  BP(mean): --  RR: 16 (22 Apr 2021 16:16) (14 - 20)  SpO2: 96% (22 Apr 2021 16:16) (91% - 100%)      Constitutional: No acute distress, alert and oriented   HEENT: AT/NC, EOMI, PERRLA, Normal conjunctiva, no pharyngeal erythema, moist oral mucosa  Respiratory: CTA BL, equal breath sounds, no crackles or wheezing  Cardiovascular: RRR, no edema  Gastrointestinal: soft, Non-tender, Non-distended + Bowel sounds, no rebound or guarding  Musculoskeletal: right elbow wrapped in ace, LE bent at knees, b/l ch arthritic changes fingers   Neurological: CN 2-12 grossly intact, bed/wheelchair bound   Skin: warm, dry and intact  Psychiatric: normal mood, pleasant     LABS:                        11.9   7.69  )-----------( 337      ( 22 Apr 2021 07:23 )             38.4     04-22    139  |  103  |  10.0  ----------------------------<  124<H>  5.0   |  27.0  |  0.32<L>    Ca    8.8      22 Apr 2021 07:23  Mg     2.2     04-22    TPro  6.8  /  Alb  3.3  /  TBili  <0.2<L>  /  DBili  x   /  AST  18  /  ALT  13  /  AlkPhos  90  04-22    PT/INR - ( 21 Apr 2021 12:30 )   PT: 12.5 sec;   INR: 1.08 ratio         PTT - ( 21 Apr 2021 12:30 )  PTT:36.7 sec            RADIOLOGY & ADDITIONAL TESTS:  4/21/21 XR Elbow (Rt)  No acute radiographic findings and no change, if there is continued clinical concern of abscess follow-up MRI can be ordered    4/22/21 CXR  IMPRESSION: No acute cardiopulmonary disease process.

## 2021-04-26 NOTE — PHYSICAL THERAPY INITIAL EVALUATION ADULT - ADDITIONAL COMMENTS
Pt reports wheelchair bound with independent SPT, independent ADLs. Propels chair with bilateral LEs, uses grab bars (x3) in bathroom, tub bench. Daughter cooks, groceries are delivered. Daughter and son in law work opposite shifts as pt is not alone. Pt reports supportive extended family who can assist upon discharge.
as per pt: resides in the private house, (+) ramp to enter, primary mode of locomotion manual W/C, denies hx of falls, independent with all activities at the W/C level, uses sitting pivot transfer techniques for OOB/out of the W/C transfer, Family available to assist as needed upon D/C home

## 2021-04-26 NOTE — PHYSICAL THERAPY INITIAL EVALUATION ADULT - GENERAL OBSERVATIONS, REHAB EVAL
Pt received seated in w/c, +ext cath, wound vac, Right UE splint, NAD
Pt received sitting in the W/C next to bed, NAD.

## 2021-04-26 NOTE — PHARMACOTHERAPY INTERVENTION NOTE - COMMENTS
Vancomycin level ordered
Vancomycin level 24.5, adjusted dose to 750 mg IV q12h.  level ordered prior to next dose.

## 2021-04-26 NOTE — PROGRESS NOTE ADULT - REASON FOR ADMISSION
Right Elbow pain

## 2021-04-26 NOTE — PHYSICAL THERAPY INITIAL EVALUATION ADULT - IMPAIRMENTS FOUND, PT EVAL
aerobic capacity/endurance/gait, locomotion, and balance/muscle strength/neuromotor development and sensory integration/ROM

## 2021-04-26 NOTE — PHYSICAL THERAPY INITIAL EVALUATION ADULT - DIAGNOSIS, PT EVAL
Pt with decreased functional mobility secondary to LE weakness, decrease balance and decreased endurance.
Pt is not a candidate for skilled PT treatment due to lack of functional goals in current setting.

## 2021-04-26 NOTE — PHYSICAL THERAPY INITIAL EVALUATION ADULT - PLANNED THERAPY INTERVENTIONS, PT EVAL
balance training/bed mobility training/gait training/motor coordination training/neuromuscular re-education/ROM/strengthening/transfer training

## 2021-04-26 NOTE — PHYSICAL THERAPY INITIAL EVALUATION ADULT - LIVES WITH, PROFILE
Family
Pt lives with daughter and son in law in private home, ramp to enter, no steps inside/children

## 2021-04-26 NOTE — DISCHARGE NOTE NURSING/CASE MANAGEMENT/SOCIAL WORK - PATIENT PORTAL LINK FT
You can access the FollowMyHealth Patient Portal offered by [facility] by registering at the following website: [email or web address]. By joining [employer]’s FollowMyHealth portal, you will also be able to view your health information using other applications (apps) compatible with our system.

## 2021-04-28 ENCOUNTER — APPOINTMENT (OUTPATIENT)
Dept: ORTHOPEDIC SURGERY | Facility: CLINIC | Age: 71
End: 2021-04-28

## 2021-05-03 ENCOUNTER — APPOINTMENT (OUTPATIENT)
Dept: ORTHOPEDIC SURGERY | Facility: CLINIC | Age: 71
End: 2021-05-03
Payer: COMMERCIAL

## 2021-05-03 VITALS — TEMPERATURE: 95.6 F

## 2021-05-03 PROCEDURE — 99024 POSTOP FOLLOW-UP VISIT: CPT

## 2021-05-03 NOTE — HISTORY OF PRESENT ILLNESS
[de-identified] : Status post irrigation and debridement of right elbow septic olecranon bursitis on 4/23/2021, status post repeat irrigation and debridement for wound dehiscence with application of a wound VAC on 4/25/2021 [de-identified] : Benja is a pleasant 71-year-old female who returns to the office today status post irrigation and debridement x2 for right elbow septic olecranon bursitis with a local wound dehiscence.  Patient states that she feels well and has had improvement in her pain since her most recent surgery.  She has remained nonweightbearing of her right upper extremity in a resting extension splint with her Prevena wound VAC in place.  She reports no fevers, chills, sweats, redness, warmth, drainage, numbness, tingling, weakness, or pain elsewhere. [de-identified] : On exam, the patient is pleasant.  They  are awake, alert, and oriented x3.  Patient presents in a wheelchair per her baseline.\par Weight is appropriate for height\par Full range of motion of cervical spine without instability\par Full range of motion of back without instability\par Intact neurologic, vascular, and dermatologic exam to right and left upper extremities\par Intact neurologic, vascular, and dermatologic exam to right and left lower extremities\par \par Right upper Extremity\par The patient's splint and wound VAC were removed today in the office.  Incision remains well approximated with minimal erythema, warmth, or swelling.  Sutures remain intact.  There is mild periincisional tenderness to palpation.  No bony tenderness palpation.  No strength testing performed.  Range of motion: 0 to 75 degrees.  AIN/PIN/radial/ulnar/median motor function is intact, sensation intact light touch in C5-T1 distributions, radial pulse 2+, compartments soft and compressible. [de-identified] : 71-year-old female status post irrigation and debridement of right elbow septic olecranon bursitis on 4/23/2021, status post repeat irrigation and debridement for wound dehiscence with application of a wound VAC on 4/25/2021 [de-identified] : Overall Benja is recovering well from her most recent surgery.  Splint and wound VAC were removed today in the office and the incision appears to be healing well at this time without any recurrence of dehiscence.  At this point we will continue to monitor the incision on a weekly basis.  She may do dry dressing changes on her own daily at home.  She will continue with her intravenous antibiotics as per infectious disease.  She is advised to avoid any heavy lifting with the right arm and should not lean on her elbow at all.  Recommend follow-up in 1 week for another wound check.  Patient verbalizes understanding and agrees with the plan.  All questions were answered to her satisfaction.

## 2021-05-11 ENCOUNTER — APPOINTMENT (OUTPATIENT)
Dept: ORTHOPEDIC SURGERY | Facility: CLINIC | Age: 71
End: 2021-05-11
Payer: COMMERCIAL

## 2021-05-11 VITALS
DIASTOLIC BLOOD PRESSURE: 68 MMHG | TEMPERATURE: 97.4 F | BODY MASS INDEX: 24.11 KG/M2 | WEIGHT: 150 LBS | HEIGHT: 66 IN | SYSTOLIC BLOOD PRESSURE: 116 MMHG | HEART RATE: 80 BPM

## 2021-05-11 PROCEDURE — 99212 OFFICE O/P EST SF 10 MIN: CPT

## 2021-05-11 PROCEDURE — 99072 ADDL SUPL MATRL&STAF TM PHE: CPT

## 2021-05-11 NOTE — HISTORY OF PRESENT ILLNESS
[de-identified] : Status post irrigation and debridement of right elbow septic olecranon bursitis on 4/23/2021, status post repeat irrigation and debridement for wound dehiscence with application of a wound VAC on 4/25/2021 [de-identified] : Benja is a pleasant 71-year-old female who returns to the office today status post irrigation and debridement x2 for right elbow septic olecranon bursitis with a local wound dehiscence.  Patient states that she feels well and has had improvement in her pain since her most recent surgery.  She has remained nonweightbearing of her right upper extremity in a soft dressing.  She is continuing with antibiotics as per infectious disease.  She reports no fevers, chills, sweats, redness, warmth, drainage, numbness, tingling, weakness, or pain elsewhere. [de-identified] : On exam, the patient is pleasant.  They  are awake, alert, and oriented x3.  Patient presents in a wheelchair per her baseline.\par Weight is appropriate for height\par Full range of motion of cervical spine without instability\par Full range of motion of back without instability\par Intact neurologic, vascular, and dermatologic exam to right and left upper extremities\par Intact neurologic, vascular, and dermatologic exam to right and left lower extremities\par \par Right upper Extremity\par The patient's incision remains well approximated and is healing well with minimal erythema, warmth, or swelling.  Sutures remain intact.  Incisions were cleaned with alcohol and the sutures removed in their entirety.  Steri-Strips were then applied.  The patient tolerated this well.  There is minimal periincisional tenderness to palpation.  No bony tenderness palpation.  No strength testing performed.  Range of motion: 0 to 100 degrees.  AIN/PIN/radial/ulnar/median motor function is intact, sensation intact light touch in C5-T1 distributions, radial pulse 2+, compartments soft and compressible. [de-identified] : 71-year-old female status post irrigation and debridement of right elbow septic olecranon bursitis on 4/23/2021, status post repeat irrigation and debridement for wound dehiscence with application of a wound VAC on 4/25/2021 [de-identified] : Overall Benja is recovering well from her most recent surgery.  She will continue with antibiotics as per infectious disease. She has a follow-up appointment with them this week.  Sutures removed today in the office.  Patient is advised to continue not to lean on the elbow.  Recommend follow-up in 1 week for another wound check.  She verbalizes understanding and agrees with the plan.  All questions were answered to her satisfaction.

## 2021-05-13 ENCOUNTER — APPOINTMENT (OUTPATIENT)
Dept: INTERNAL MEDICINE | Facility: CLINIC | Age: 71
End: 2021-05-13
Payer: COMMERCIAL

## 2021-05-13 VITALS
SYSTOLIC BLOOD PRESSURE: 117 MMHG | DIASTOLIC BLOOD PRESSURE: 71 MMHG | HEART RATE: 71 BPM | WEIGHT: 150 LBS | HEIGHT: 66 IN | TEMPERATURE: 97.1 F | BODY MASS INDEX: 24.11 KG/M2

## 2021-05-13 DIAGNOSIS — M00.9 PYOGENIC ARTHRITIS, UNSPECIFIED: ICD-10-CM

## 2021-05-13 DIAGNOSIS — A49.02 METHICILLIN RESISTANT STAPHYLOCOCCUS AUREUS INFECTION, UNSPECIFIED SITE: ICD-10-CM

## 2021-05-13 PROCEDURE — 99214 OFFICE O/P EST MOD 30 MIN: CPT

## 2021-05-13 PROCEDURE — 99072 ADDL SUPL MATRL&STAF TM PHE: CPT

## 2021-05-13 NOTE — HISTORY OF PRESENT ILLNESS
[FreeTextEntry1] : This 70 y.o F with Neuropathy, Neurogenic bladder, History of back surgery, who\par had trauma to the RIGHT elbow 2 weeks ago. She reports of skin abrasion. said\par it was "fine for a few days" and then became infected. She presented tot  ER\par on 4/14/21, seen by ortho team. Patient states she was on PO Keflex after.\par She followed up in Dr. Fischer's office today and was sent to ER to add on for\par surgery today. NO other complaints at this time.\par Reports hx of MRSA infection in the past. unclear time/date.\par \par denies allergies to antibiotics\par Reports that she is wheelchair bound.\par \par She was last seen By Dr. Jonas Florence on 4/26/21 for:\par \par right elbow infection\par right elbow pain\par failure of antibiotics as outpatient\par CA-MRSA infection\par \par Plan:\par - BCX ngtd\par - CA-MRSA infection\par - Cefazolin stopped\par - in view of Susceptibilities, will Stop Vancomycin\par - start Doxycycline 100mg PO BID\par  x 28 day course THRU 5/24/2021\par   \par patient is now here for follow up care.\par \par \par since the last hospital visit, she saw Dr. Fischer on 5/11/2021\par suture were removed and steri-strips were placed on the RIGHT elbow\par \par no fevers\par no chills\par no diarrhea\par tolerating antibiotics\par taking a probiotics by mouth. \par

## 2021-05-13 NOTE — ASSESSMENT
[FreeTextEntry1] : Patient is clinically better\par \par no more drainage\par no wound\par \par can continue the course of doxycycline 100mg PO BID \par  x 28 day course THRU 5/24/2021\par \par spoke to patient about probiotics use\par \par \par spoke to pt regarding sun-exposure while on doxycycline.\par suggest avoidance \par and/or use of high SPF sunblock.\par \par \par she can follow with me PRN. \par    [Treatment Education] : treatment education [Treatment Adherence] : treatment adherence [Rx Dose / Side Effects] : Rx dose/side effects [Risk Reduction] : risk reduction [Anticipatory Guidance] : anticipatory guidance

## 2021-05-13 NOTE — PHYSICAL EXAM
[General Appearance - In No Acute Distress] : in no acute distress [General Appearance - Alert] : alert [Sclera] : the sclera and conjunctiva were normal [PERRL With Normal Accommodation] : pupils were equal in size, round, reactive to light [Extraocular Movements] : extraocular movements were intact [Outer Ear] : the ears and nose were normal in appearance [Oropharynx] : the oropharynx was normal with no thrush [Auscultation Breath Sounds / Voice Sounds] : lungs were clear to auscultation bilaterally [Heart Rate And Rhythm] : heart rate was normal and rhythm regular [Heart Sounds] : normal S1 and S2 [Heart Sounds Gallop] : no gallops [Murmurs] : no murmurs [Heart Sounds Pericardial Friction Rub] : no pericardial rub [Full Pulse] : the pedal pulses are present [Edema] : there was no peripheral edema [Bowel Sounds] : normal bowel sounds [Abdomen Soft] : soft [Abdomen Tenderness] : non-tender [Abdomen Mass (___ Cm)] : no abdominal mass palpated [Costovertebral Angle Tenderness] : no CVA tenderness [Skin Color & Pigmentation] : normal skin color and pigmentation [] : no rash [Cranial Nerves] : cranial nerves 2-12 were intact [Sensation] : the sensory exam was normal to light touch and pinprick [FreeTextEntry1] : paraplegia [Oriented To Time, Place, And Person] : oriented to person, place, and time [Affect] : the affect was normal

## 2021-05-13 NOTE — DATA REVIEWED
[FreeTextEntry1] : \par =======================================================\par Current Antibiotics:\par doxycycline hyclate Capsule 100 milliGRAM(s) Oral every 12 hours\par \par Other medications:\par bethanechol 10 milliGRAM(s) Oral three times a day\par celecoxib 200 milliGRAM(s) Oral daily\par escitalopram 20 milliGRAM(s) Oral daily\par gabapentin 300 milliGRAM(s) Oral three times a day\par heparin Injectable 5000 Unit(s) SubCutaneous every 8 hours\par \par \par =======================================================\par Labs:\par \par  11.2\par 6.77 )-----------( 286 ( 26 Apr 2021 05:55 )\par  35.7\par \par 04-26\par \par 140 | 104 | 11.0\par ----------------------------< 82\par 4.5 | 25.0 | 0.24<L>\par \par Ca 8.7 26 Apr 2021 05:55\par \par TPro 6.3<L> / Alb 3.1<L> / TBili <0.2<L> / DBili x / AST 23 /\par ALT 16 / AlkPhos 88 04-26\par \par \par Culture - Surgical Swab (collected 04-22-21 @ 00:41)\par Source: .Surgical Swab right elbow ole cranon bursa #3\par Organism: Methicillin resistant Staphylococcus aureus (04-23-21 @ 17:27)\par Organism: Methicillin resistant Staphylococcus aureus (04-23-21 @ 17:27)\par  Sensitivities:\par  - Ampicillin/Sulbactam: R 16/8\par  - Cefazolin: R 16\par  - Clindamycin: S <=0.25\par  - Daptomycin: S 0.5\par  - Erythromycin: R >4\par  - Gentamicin: S <=1 Should not be used as monotherapy\par  - Linezolid: S 2\par  - Oxacillin: R >2\par  - Penicillin: R >8\par  - RIF- Rifampin: S <=1 Should not be used as monotherapy\par  - Tetra/Doxy: S <=1\par  - Trimethoprim/Sulfamethoxazole: S <=0.5/9.5\par  - Vancomycin: S 1\par  Method Type: ALIZA\par \par Culture - Surgical Swab (collected 04-22-21 @ 00:41)\par Source: .Surgical Swab right elbow ole cranon bursa #2\par \par Culture - Surgical Swab (collected 04-22-21 @ 00:41)\par Source: .Surgical Swab right elbow ole cranon bursa #1\par Organism: Methicillin resistant Staphylococcus aureus (04-23-21 @ 17:34)\par Organism: Methicillin resistant Staphylococcus aureus (04-23-21 @ 17:34)\par  Sensitivities:\par  - Ampicillin/Sulbactam: R 16/8\par  - Cefazolin: R 16\par  - Clindamycin: S <=0.25\par  - Daptomycin: S 1\par  - Erythromycin: R >4\par  - Gentamicin: S <=1 Should not be used as monotherapy\par  - Linezolid: S 2\par  - Oxacillin: R >2\par  - Penicillin: R >8\par  - RIF- Rifampin: S <=1 Should not be used as monotherapy\par  - Tetra/Doxy: S <=1\par  - Trimethoprim/Sulfamethoxazole: S <=0.5/9.5\par  - Vancomycin: S 2\par  Method Type: ALIZA\par \par Culture - Blood (collected 04-21-21 @ 13:21)\par Source: .Blood Blood-Peripheral\par \par Culture - Blood (collected 04-21-21 @ 13:19)\par Source: .Blood Blood-Peripheral\par \par \par C-Reactive Protein, Serum: 15 mg/L (04-21-21 @ 12:27)\par C-Reactive Protein, Serum: 85 mg/L (04-14-21 @ 22:50)\par \par Sedimentation Rate, Erythrocyte: 45 mm/hr (04-21-21 @ 12:27)\par \par \par \par COVID-19 PCR: NotDetec (04-21-21 @ 13:20)\par

## 2021-05-19 ENCOUNTER — APPOINTMENT (OUTPATIENT)
Dept: ORTHOPEDIC SURGERY | Facility: CLINIC | Age: 71
End: 2021-05-19
Payer: COMMERCIAL

## 2021-05-19 VITALS
WEIGHT: 151 LBS | SYSTOLIC BLOOD PRESSURE: 120 MMHG | BODY MASS INDEX: 24.27 KG/M2 | HEART RATE: 73 BPM | TEMPERATURE: 97.3 F | HEIGHT: 66 IN | DIASTOLIC BLOOD PRESSURE: 75 MMHG

## 2021-05-19 DIAGNOSIS — M25.521 PAIN IN RIGHT ELBOW: ICD-10-CM

## 2021-05-19 PROCEDURE — 99072 ADDL SUPL MATRL&STAF TM PHE: CPT

## 2021-05-19 PROCEDURE — 99212 OFFICE O/P EST SF 10 MIN: CPT

## 2021-05-19 NOTE — HISTORY OF PRESENT ILLNESS
[de-identified] : Status post irrigation and debridement of right elbow septic olecranon bursitis on 4/23/2021, status post repeat irrigation and debridement for wound dehiscence with application of a wound VAC on 4/25/2021 [de-identified] : Benja is a pleasant 71-year-old female who returns to the office today status post irrigation and debridement x2 for right elbow septic olecranon bursitis with a local wound dehiscence.  Patient states that she feels very well and has no pain.  She is not taking any medicine for pain.  She has 5 more days left of the antibiotics which she is on as per infectious disease.  She saw Dr. Florence last week who noted significant improvement in her elbow.  She reports no fevers, chills, sweats, numbness, tingling, weakness, redness, warmth, drainage, or pain elsewhere. [de-identified] : On exam, the patient is pleasant.  They  are awake, alert, and oriented x3.  Patient presents in a wheelchair per her baseline.\par Weight is appropriate for height\par Full range of motion of cervical spine without instability\par Full range of motion of back without instability\par Intact neurologic, vascular, and dermatologic exam to right and left upper extremities\par Intact neurologic, vascular, and dermatologic exam to right and left lower extremities\par \par Right upper Extremity\par The patient's incision is well-healed without any erythema, warmth, drainage, or recurrent dehiscence.  Steri-Strips were removed today in the office.  There is no swelling or joint effusion.  There is no pain to palpation.  Patient's range of motion is from 0 to 140 degrees without pain.  Strength is 5/5.  AIN/PIN/radial/ulnar/median motor function is intact, sensation intact light touch in C5-T1 distributions, radial pulse 2+, compartments soft and compressible. [de-identified] : 71-year-old female status post irrigation and debridement of right elbow septic olecranon bursitis on 4/23/2021, status post repeat irrigation and debridement for wound dehiscence with application of a wound VAC on 4/25/2021 [de-identified] : Benja has recovered well from her recent surgeries.  Her incision is well-healed and there are no signs of any recurrent infection at this point.  She has full strength and range of motion of the elbow.  She will finish the course of antibiotics that she is on as per infectious disease.  She may return to her normal activities as tolerated at this point.  She may follow-up in 6 weeks.  Patient verbalizes understanding and agrees with this plan.  All questions were answered to her satisfaction.

## 2021-06-01 PROCEDURE — 87075 CULTR BACTERIA EXCEPT BLOOD: CPT

## 2021-06-01 PROCEDURE — 86905 BLOOD TYPING RBC ANTIGENS: CPT

## 2021-06-01 PROCEDURE — 86140 C-REACTIVE PROTEIN: CPT

## 2021-06-01 PROCEDURE — 83735 ASSAY OF MAGNESIUM: CPT

## 2021-06-01 PROCEDURE — 96375 TX/PRO/DX INJ NEW DRUG ADDON: CPT

## 2021-06-01 PROCEDURE — 80048 BASIC METABOLIC PNL TOTAL CA: CPT

## 2021-06-01 PROCEDURE — 99285 EMERGENCY DEPT VISIT HI MDM: CPT | Mod: 25

## 2021-06-01 PROCEDURE — 93005 ELECTROCARDIOGRAM TRACING: CPT

## 2021-06-01 PROCEDURE — 87070 CULTURE OTHR SPECIMN AEROBIC: CPT

## 2021-06-01 PROCEDURE — 86901 BLOOD TYPING SEROLOGIC RH(D): CPT

## 2021-06-01 PROCEDURE — 86803 HEPATITIS C AB TEST: CPT

## 2021-06-01 PROCEDURE — 73080 X-RAY EXAM OF ELBOW: CPT

## 2021-06-01 PROCEDURE — 97163 PT EVAL HIGH COMPLEX 45 MIN: CPT

## 2021-06-01 PROCEDURE — 87040 BLOOD CULTURE FOR BACTERIA: CPT

## 2021-06-01 PROCEDURE — 71045 X-RAY EXAM CHEST 1 VIEW: CPT

## 2021-06-01 PROCEDURE — 96374 THER/PROPH/DIAG INJ IV PUSH: CPT

## 2021-06-01 PROCEDURE — 85610 PROTHROMBIN TIME: CPT

## 2021-06-01 PROCEDURE — 85730 THROMBOPLASTIN TIME PARTIAL: CPT

## 2021-06-01 PROCEDURE — 87635 SARS-COV-2 COVID-19 AMP PRB: CPT

## 2021-06-01 PROCEDURE — 86900 BLOOD TYPING SEROLOGIC ABO: CPT

## 2021-06-01 PROCEDURE — 85025 COMPLETE CBC W/AUTO DIFF WBC: CPT

## 2021-06-01 PROCEDURE — 86880 COOMBS TEST DIRECT: CPT

## 2021-06-01 PROCEDURE — 36415 COLL VENOUS BLD VENIPUNCTURE: CPT

## 2021-06-01 PROCEDURE — 97167 OT EVAL HIGH COMPLEX 60 MIN: CPT

## 2021-06-01 PROCEDURE — 80053 COMPREHEN METABOLIC PANEL: CPT

## 2021-06-01 PROCEDURE — 85027 COMPLETE CBC AUTOMATED: CPT

## 2021-06-01 PROCEDURE — 87186 SC STD MICRODIL/AGAR DIL: CPT

## 2021-06-01 PROCEDURE — 86769 SARS-COV-2 COVID-19 ANTIBODY: CPT

## 2021-06-01 PROCEDURE — 86870 RBC ANTIBODY IDENTIFICATION: CPT

## 2021-06-01 PROCEDURE — 86850 RBC ANTIBODY SCREEN: CPT

## 2021-06-01 PROCEDURE — 85652 RBC SED RATE AUTOMATED: CPT

## 2021-06-01 PROCEDURE — 80202 ASSAY OF VANCOMYCIN: CPT

## 2022-04-15 ENCOUNTER — APPOINTMENT (OUTPATIENT)
Dept: DERMATOLOGY | Facility: CLINIC | Age: 72
End: 2022-04-15
Payer: COMMERCIAL

## 2022-04-15 PROCEDURE — 99213 OFFICE O/P EST LOW 20 MIN: CPT

## 2022-06-17 ENCOUNTER — APPOINTMENT (OUTPATIENT)
Dept: DERMATOLOGY | Facility: CLINIC | Age: 72
End: 2022-06-17
Payer: COMMERCIAL

## 2022-06-17 PROCEDURE — 99213 OFFICE O/P EST LOW 20 MIN: CPT | Mod: 25

## 2022-06-17 PROCEDURE — 17003 DESTRUCT PREMALG LES 2-14: CPT

## 2022-06-17 PROCEDURE — 17000 DESTRUCT PREMALG LESION: CPT

## 2023-05-31 ENCOUNTER — APPOINTMENT (OUTPATIENT)
Dept: DERMATOLOGY | Facility: CLINIC | Age: 73
End: 2023-05-31
Payer: COMMERCIAL

## 2023-05-31 PROCEDURE — 17000 DESTRUCT PREMALG LESION: CPT

## 2023-05-31 PROCEDURE — 99213 OFFICE O/P EST LOW 20 MIN: CPT | Mod: 25

## 2023-06-08 ENCOUNTER — APPOINTMENT (OUTPATIENT)
Dept: ORTHOPEDIC SURGERY | Facility: CLINIC | Age: 73
End: 2023-06-08
Payer: COMMERCIAL

## 2023-06-08 VITALS
SYSTOLIC BLOOD PRESSURE: 145 MMHG | DIASTOLIC BLOOD PRESSURE: 81 MMHG | HEIGHT: 66 IN | WEIGHT: 151 LBS | BODY MASS INDEX: 24.27 KG/M2

## 2023-06-08 DIAGNOSIS — M70.61 TROCHANTERIC BURSITIS, RIGHT HIP: ICD-10-CM

## 2023-06-08 DIAGNOSIS — M25.551 PAIN IN RIGHT HIP: ICD-10-CM

## 2023-06-08 PROCEDURE — 99214 OFFICE O/P EST MOD 30 MIN: CPT

## 2023-06-08 PROCEDURE — 73502 X-RAY EXAM HIP UNI 2-3 VIEWS: CPT

## 2023-06-08 NOTE — HISTORY OF PRESENT ILLNESS
[de-identified] : The patient is a pleasant 70 year old female who presents to the office today for new onset right hip pain. The patient states she has had spine surgery which left her with some residual damage to the nerves of the right lower extremity and patchy neuropathy. She has decreased sensation globally in her leg.  She has been seeing her rheumatologist who has been treating her with Prolia and disease modifying anti-rheumatoid medications successfully.  Patient is non ambulatory and is wheelchair bound. Her hip pain is localized mostly laterally. She notes some pain radiates down the right leg. She has not had any injections. \par \par

## 2023-06-08 NOTE — ADDENDUM
[FreeTextEntry1] : This note was authored by Teja Lee working as a medical scribe for Dr. Germain Mcneal. The note was reviewed, edited, and revised by Dr. Germain Mcneal whom is in agreement with the exam findings, imaging findings, and treatment plan. 06/08/2023

## 2023-06-08 NOTE — DISCUSSION/SUMMARY
[de-identified] : PATRIA MCLAIN is a 73 year old female who presents with mild right hip arthritis, lateral right hip and thigh pain, and right GT bursitis.  She has fairly significant pain with right hip range of motion.  More so than I would expect based on her x-ray findings.  She also likely has some component of radicular pain as the pain does radiate down her leg.  An MRI of the patient's right hip was ordered to evaluate for septic arthritis versus osteonecrosis.  She also has a history of osteomyelitis of the right ischium which the MRI will also be helpful in assessing.  This was managed previously by plastic surgery.  the patient will follow up after imaging is completed to review the results in the office. At that point we may consider a cortisone injection in her right GT bursa depending on the findings.

## 2023-06-08 NOTE — PHYSICAL EXAM
[de-identified] : The patient appears well nourished  and in no apparent distress.  The patient is alert and oriented to person, place, and time.   Affect and mood appear normal. The head is normocephalic and atraumatic.  The eyes reveal normal sclera and extra ocular muscles are intact. The tongue is midline with no apparent lesions.  Skin shows normal turgor with no evidence of eczema or psoriasis.  No respiratory distress noted.   [de-identified] : Exam of the right hip shows hip external rotation of 30 degrees, hip internal rotation of 25 degrees. There is tenderness over the GT bursa.   [de-identified] : X-ray: AP view of the pelvis and 2 views of the right hip demonstrate mild joint space narrowing.  AP pelvis view shows bone on bone left hip arthritis.

## 2023-07-05 ENCOUNTER — APPOINTMENT (OUTPATIENT)
Dept: MRI IMAGING | Facility: CLINIC | Age: 73
End: 2023-07-05

## 2023-07-05 ENCOUNTER — OUTPATIENT (OUTPATIENT)
Dept: OUTPATIENT SERVICES | Facility: HOSPITAL | Age: 73
LOS: 1 days | End: 2023-07-05
Payer: COMMERCIAL

## 2023-07-05 DIAGNOSIS — M25.551 PAIN IN RIGHT HIP: ICD-10-CM

## 2023-07-05 DIAGNOSIS — Z98.89 OTHER SPECIFIED POSTPROCEDURAL STATES: Chronic | ICD-10-CM

## 2023-07-05 PROCEDURE — 73721 MRI JNT OF LWR EXTRE W/O DYE: CPT | Mod: 26,RT

## 2023-07-13 ENCOUNTER — APPOINTMENT (OUTPATIENT)
Dept: ORTHOPEDIC SURGERY | Facility: CLINIC | Age: 73
End: 2023-07-13
Payer: COMMERCIAL

## 2023-07-13 VITALS
BODY MASS INDEX: 24.27 KG/M2 | HEIGHT: 66 IN | SYSTOLIC BLOOD PRESSURE: 122 MMHG | WEIGHT: 151 LBS | DIASTOLIC BLOOD PRESSURE: 72 MMHG

## 2023-07-13 DIAGNOSIS — M16.11 UNILATERAL PRIMARY OSTEOARTHRITIS, RIGHT HIP: ICD-10-CM

## 2023-07-13 PROCEDURE — 99213 OFFICE O/P EST LOW 20 MIN: CPT

## 2023-07-13 NOTE — HISTORY OF PRESENT ILLNESS
[de-identified] : The patient is a pleasant 70 year old female who presents to the office today for new onset right hip pain. The patient states she has had spine surgery which left her with some residual damage to the nerves of the right lower extremity and patchy neuropathy. She has decreased sensation globally in her leg.  She has been seeing her rheumatologist who has been treating her with Prolia and disease modifying anti-rheumatoid medications successfully.  Patient is non ambulatory and is wheelchair bound. Her hip pain is localized mostly laterally. She notes some pain radiates down the right leg. She has not had any injections.  Patient had been seen in office earlier this month and sent for an MRI to rule out osteomyelitis versus septic arthritis of the right hip.  He presents today for MRI follow-up, and reports that her hip is doing much better actually\par \par

## 2023-07-13 NOTE — DISCUSSION/SUMMARY
[de-identified] : PATRIA MCLAIN is a 73 year old female who presents with mild right hip arthritis, lateral right hip and thigh pain, and right GT bursitis.  Conservative options were discussed.  We discussed the option for an intra-articular injection however she states that overall her pain is much better.  Patient declines physical therapy prescription as well.  She currently takes Celebrex we will continue with this.  She will follow-up as needed.

## 2023-07-13 NOTE — PHYSICAL EXAM
[de-identified] : The patient appears well nourished  and in no apparent distress.  The patient is alert and oriented to person, place, and time.   Affect and mood appear normal. The head is normocephalic and atraumatic.  The eyes reveal normal sclera and extra ocular muscles are intact. The tongue is midline with no apparent lesions.  Skin shows normal turgor with no evidence of eczema or psoriasis.  No respiratory distress noted.   [de-identified] : Exam of the right hip shows hip external rotation of 30 degrees, hip internal rotation of 25 degrees. There is tenderness over the GT bursa.   [de-identified] : On the right knee taken 7/5/2023 reveals no evidence of septic arthritis or osteomyelitis.  Degenerative change of the hip including chondral loss and labral degeneration, high-grade partial-thickness tears of the iliopsoas and hamstring tendons.\par \par Prior X-ray: AP view of the pelvis and 2 views of the right hip demonstrate mild joint space narrowing.  AP pelvis view shows bone on bone left hip arthritis.

## 2024-06-03 ENCOUNTER — APPOINTMENT (OUTPATIENT)
Dept: DERMATOLOGY | Facility: CLINIC | Age: 74
End: 2024-06-03
Payer: COMMERCIAL

## 2024-06-03 PROCEDURE — 99213 OFFICE O/P EST LOW 20 MIN: CPT | Mod: 25

## 2024-06-03 PROCEDURE — 17003 DESTRUCT PREMALG LES 2-14: CPT

## 2024-06-03 PROCEDURE — 17000 DESTRUCT PREMALG LESION: CPT | Mod: 59

## 2024-06-03 PROCEDURE — 11102 TANGNTL BX SKIN SINGLE LES: CPT

## 2024-07-24 NOTE — PROGRESS NOTE ADULT - PROVIDER SPECIALTY LIST ADULT
Called pt on 07/24/24 for an appointment reminder for 07/25/24. Pt confirmed     Reminded patient to complete their visit pre-check/digital registration in [employer].   
Infectious Disease
Orthopedics
Hospitalist
Infectious Disease
Orthopedics
Hospitalist
Hospitalist
Infectious Disease
Infectious Disease
Hospitalist
Hospitalist

## 2024-08-08 NOTE — OCCUPATIONAL THERAPY INITIAL EVALUATION ADULT - LIGHT TOUCH SENSATION, RUE, REHAB EVAL
Additional Comments Patient requesting a 90 day supply script.    Pended       Refill request for  medication.     OMEGA-3 ETHYL ESTERS 1 GM     Name of Pharmacy- [facility]      Last visit - 4/24/2024    Pending visit - 10/24/2024    Last refill -06/12/2024                   within normal limits

## 2025-06-10 ENCOUNTER — APPOINTMENT (OUTPATIENT)
Dept: DERMATOLOGY | Facility: CLINIC | Age: 75
End: 2025-06-10
Payer: COMMERCIAL

## 2025-06-10 PROCEDURE — 99213 OFFICE O/P EST LOW 20 MIN: CPT
